# Patient Record
Sex: FEMALE | Race: ASIAN | NOT HISPANIC OR LATINO
[De-identification: names, ages, dates, MRNs, and addresses within clinical notes are randomized per-mention and may not be internally consistent; named-entity substitution may affect disease eponyms.]

---

## 2017-04-07 ENCOUNTER — APPOINTMENT (OUTPATIENT)
Dept: ORTHOPEDIC SURGERY | Facility: CLINIC | Age: 47
End: 2017-04-07

## 2017-04-07 ENCOUNTER — APPOINTMENT (OUTPATIENT)
Dept: MRI IMAGING | Facility: CLINIC | Age: 47
End: 2017-04-07

## 2017-04-07 ENCOUNTER — OUTPATIENT (OUTPATIENT)
Dept: OUTPATIENT SERVICES | Facility: HOSPITAL | Age: 47
LOS: 1 days | End: 2017-04-07
Payer: COMMERCIAL

## 2017-04-07 VITALS
SYSTOLIC BLOOD PRESSURE: 129 MMHG | HEIGHT: 64 IN | DIASTOLIC BLOOD PRESSURE: 74 MMHG | HEART RATE: 79 BPM | BODY MASS INDEX: 26.98 KG/M2 | WEIGHT: 158 LBS

## 2017-04-07 DIAGNOSIS — M25.562 PAIN IN LEFT KNEE: ICD-10-CM

## 2017-04-07 PROCEDURE — 73721 MRI JNT OF LWR EXTRE W/O DYE: CPT

## 2017-04-18 ENCOUNTER — APPOINTMENT (OUTPATIENT)
Dept: ORTHOPEDIC SURGERY | Facility: AMBULATORY SURGERY CENTER | Age: 47
End: 2017-04-18

## 2017-04-27 ENCOUNTER — APPOINTMENT (OUTPATIENT)
Dept: ORTHOPEDIC SURGERY | Facility: CLINIC | Age: 47
End: 2017-04-27

## 2017-07-27 ENCOUNTER — APPOINTMENT (OUTPATIENT)
Dept: INTERNAL MEDICINE | Facility: CLINIC | Age: 47
End: 2017-07-27

## 2017-08-02 ENCOUNTER — APPOINTMENT (OUTPATIENT)
Dept: INTERNAL MEDICINE | Facility: CLINIC | Age: 47
End: 2017-08-02
Payer: COMMERCIAL

## 2017-08-02 VITALS
OXYGEN SATURATION: 98 % | SYSTOLIC BLOOD PRESSURE: 120 MMHG | DIASTOLIC BLOOD PRESSURE: 80 MMHG | BODY MASS INDEX: 26.98 KG/M2 | HEIGHT: 64 IN | HEART RATE: 76 BPM | WEIGHT: 158 LBS

## 2017-08-02 DIAGNOSIS — M25.562 PAIN IN LEFT KNEE: ICD-10-CM

## 2017-08-02 DIAGNOSIS — Z87.42 PERSONAL HISTORY OF OTHER DISEASES OF THE FEMALE GENITAL TRACT: ICD-10-CM

## 2017-08-02 DIAGNOSIS — M23.92 UNSPECIFIED INTERNAL DERANGEMENT OF LEFT KNEE: ICD-10-CM

## 2017-08-02 DIAGNOSIS — Z87.898 PERSONAL HISTORY OF OTHER SPECIFIED CONDITIONS: ICD-10-CM

## 2017-08-02 PROCEDURE — 99386 PREV VISIT NEW AGE 40-64: CPT | Mod: 25

## 2017-08-02 PROCEDURE — 90471 IMMUNIZATION ADMIN: CPT

## 2017-08-02 PROCEDURE — 90715 TDAP VACCINE 7 YRS/> IM: CPT

## 2017-08-02 RX ORDER — METHYLPREDNISOLONE 4 MG/1
4 TABLET ORAL
Qty: 21 | Refills: 0 | Status: DISCONTINUED | COMMUNITY
Start: 2017-02-23 | End: 2017-08-02

## 2017-08-02 RX ORDER — AZITHROMYCIN 250 MG/1
250 TABLET, FILM COATED ORAL
Qty: 6 | Refills: 0 | Status: DISCONTINUED | COMMUNITY
Start: 2017-02-23 | End: 2017-08-02

## 2017-08-04 LAB
ADJUSTED MITOGEN: >10 IU/ML
ADJUSTED TB AG: 0.02 IU/ML
ALBUMIN SERPL ELPH-MCNC: 4.7 G/DL
ALP BLD-CCNC: 47 U/L
ALT SERPL-CCNC: 12 U/L
ANION GAP SERPL CALC-SCNC: 17 MMOL/L
AST SERPL-CCNC: 19 U/L
BASOPHILS # BLD AUTO: 0.05 K/UL
BASOPHILS NFR BLD AUTO: 0.6 %
BILIRUB SERPL-MCNC: 0.3 MG/DL
BUN SERPL-MCNC: 17 MG/DL
CALCIUM SERPL-MCNC: 10 MG/DL
CHLORIDE SERPL-SCNC: 102 MMOL/L
CHOLEST SERPL-MCNC: 251 MG/DL
CHOLEST/HDLC SERPL: 3.9 RATIO
CO2 SERPL-SCNC: 21 MMOL/L
CREAT SERPL-MCNC: 0.78 MG/DL
EOSINOPHIL # BLD AUTO: 0.11 K/UL
EOSINOPHIL NFR BLD AUTO: 1.4 %
GLUCOSE SERPL-MCNC: 103 MG/DL
HBA1C MFR BLD HPLC: 5.9 %
HCT VFR BLD CALC: 41.4 %
HDLC SERPL-MCNC: 64 MG/DL
HGB BLD-MCNC: 13.5 G/DL
IMM GRANULOCYTES NFR BLD AUTO: 0.3 %
LDLC SERPL CALC-MCNC: 144 MG/DL
LYMPHOCYTES # BLD AUTO: 3.93 K/UL
LYMPHOCYTES NFR BLD AUTO: 49.7 %
M TB IFN-G BLD-IMP: NEGATIVE
MAN DIFF?: NORMAL
MCHC RBC-ENTMCNC: 29.5 PG
MCHC RBC-ENTMCNC: 32.6 GM/DL
MCV RBC AUTO: 90.4 FL
MONOCYTES # BLD AUTO: 0.49 K/UL
MONOCYTES NFR BLD AUTO: 6.2 %
NEUTROPHILS # BLD AUTO: 3.3 K/UL
NEUTROPHILS NFR BLD AUTO: 41.8 %
PLATELET # BLD AUTO: 333 K/UL
POTASSIUM SERPL-SCNC: 4.1 MMOL/L
PROT SERPL-MCNC: 8.1 G/DL
QUANTIFERON GOLD NIL: 0.09 IU/ML
RBC # BLD: 4.58 M/UL
RBC # FLD: 13.7 %
SODIUM SERPL-SCNC: 140 MMOL/L
TRIGL SERPL-MCNC: 214 MG/DL
TSH SERPL-ACNC: 1.48 UIU/ML
WBC # FLD AUTO: 7.9 K/UL

## 2017-08-16 ENCOUNTER — APPOINTMENT (OUTPATIENT)
Dept: INTERNAL MEDICINE | Facility: CLINIC | Age: 47
End: 2017-08-16

## 2017-09-13 ENCOUNTER — APPOINTMENT (OUTPATIENT)
Dept: INTERNAL MEDICINE | Facility: CLINIC | Age: 47
End: 2017-09-13

## 2017-10-26 ENCOUNTER — FORM ENCOUNTER (OUTPATIENT)
Age: 47
End: 2017-10-26

## 2017-10-27 ENCOUNTER — OUTPATIENT (OUTPATIENT)
Dept: OUTPATIENT SERVICES | Facility: HOSPITAL | Age: 47
LOS: 1 days | End: 2017-10-27
Payer: COMMERCIAL

## 2017-10-27 ENCOUNTER — APPOINTMENT (OUTPATIENT)
Dept: MAMMOGRAPHY | Facility: IMAGING CENTER | Age: 47
End: 2017-10-27

## 2017-10-27 DIAGNOSIS — Z00.00 ENCOUNTER FOR GENERAL ADULT MEDICAL EXAMINATION WITHOUT ABNORMAL FINDINGS: ICD-10-CM

## 2017-10-27 PROCEDURE — 77067 SCR MAMMO BI INCL CAD: CPT

## 2017-10-27 PROCEDURE — G0202: CPT | Mod: 26

## 2017-10-27 PROCEDURE — 77063 BREAST TOMOSYNTHESIS BI: CPT | Mod: 26

## 2017-10-27 PROCEDURE — 77063 BREAST TOMOSYNTHESIS BI: CPT

## 2017-12-22 ENCOUNTER — APPOINTMENT (OUTPATIENT)
Dept: PULMONOLOGY | Facility: CLINIC | Age: 47
End: 2017-12-22
Payer: COMMERCIAL

## 2017-12-22 VITALS
SYSTOLIC BLOOD PRESSURE: 130 MMHG | BODY MASS INDEX: 25.61 KG/M2 | RESPIRATION RATE: 16 BRPM | DIASTOLIC BLOOD PRESSURE: 90 MMHG | WEIGHT: 150 LBS | HEIGHT: 64 IN | HEART RATE: 97 BPM | TEMPERATURE: 99.3 F

## 2017-12-22 PROCEDURE — 94060 EVALUATION OF WHEEZING: CPT

## 2017-12-22 PROCEDURE — 94726 PLETHYSMOGRAPHY LUNG VOLUMES: CPT

## 2017-12-22 PROCEDURE — 94729 DIFFUSING CAPACITY: CPT

## 2017-12-22 PROCEDURE — 99203 OFFICE O/P NEW LOW 30 MIN: CPT | Mod: 25

## 2017-12-22 PROCEDURE — ZZZZZ: CPT

## 2018-03-08 ENCOUNTER — MEDICATION RENEWAL (OUTPATIENT)
Age: 48
End: 2018-03-08

## 2018-07-10 ENCOUNTER — APPOINTMENT (OUTPATIENT)
Dept: PULMONOLOGY | Facility: CLINIC | Age: 48
End: 2018-07-10
Payer: COMMERCIAL

## 2018-07-10 VITALS
HEART RATE: 88 BPM | WEIGHT: 150 LBS | RESPIRATION RATE: 16 BRPM | BODY MASS INDEX: 25.61 KG/M2 | TEMPERATURE: 98.6 F | OXYGEN SATURATION: 98 % | DIASTOLIC BLOOD PRESSURE: 70 MMHG | HEIGHT: 64 IN | SYSTOLIC BLOOD PRESSURE: 110 MMHG

## 2018-07-10 PROCEDURE — 99214 OFFICE O/P EST MOD 30 MIN: CPT

## 2018-07-13 ENCOUNTER — MEDICATION RENEWAL (OUTPATIENT)
Age: 48
End: 2018-07-13

## 2018-07-17 ENCOUNTER — FORM ENCOUNTER (OUTPATIENT)
Age: 48
End: 2018-07-17

## 2018-07-18 ENCOUNTER — APPOINTMENT (OUTPATIENT)
Dept: RADIOLOGY | Facility: IMAGING CENTER | Age: 48
End: 2018-07-18
Payer: COMMERCIAL

## 2018-07-18 ENCOUNTER — OUTPATIENT (OUTPATIENT)
Dept: OUTPATIENT SERVICES | Facility: HOSPITAL | Age: 48
LOS: 1 days | End: 2018-07-18
Payer: COMMERCIAL

## 2018-07-18 ENCOUNTER — APPOINTMENT (OUTPATIENT)
Dept: CT IMAGING | Facility: IMAGING CENTER | Age: 48
End: 2018-07-18
Payer: COMMERCIAL

## 2018-07-18 DIAGNOSIS — R05 COUGH: ICD-10-CM

## 2018-07-18 PROCEDURE — 70486 CT MAXILLOFACIAL W/O DYE: CPT | Mod: 26

## 2018-07-18 PROCEDURE — 70486 CT MAXILLOFACIAL W/O DYE: CPT

## 2018-07-18 PROCEDURE — 71046 X-RAY EXAM CHEST 2 VIEWS: CPT

## 2018-07-18 PROCEDURE — 71046 X-RAY EXAM CHEST 2 VIEWS: CPT | Mod: 26

## 2018-07-27 ENCOUNTER — RESULT REVIEW (OUTPATIENT)
Age: 48
End: 2018-07-27

## 2018-08-20 ENCOUNTER — APPOINTMENT (OUTPATIENT)
Dept: MRI IMAGING | Facility: CLINIC | Age: 48
End: 2018-08-20
Payer: COMMERCIAL

## 2018-08-20 ENCOUNTER — OUTPATIENT (OUTPATIENT)
Dept: OUTPATIENT SERVICES | Facility: HOSPITAL | Age: 48
LOS: 1 days | End: 2018-08-20
Payer: COMMERCIAL

## 2018-08-20 DIAGNOSIS — Z00.8 ENCOUNTER FOR OTHER GENERAL EXAMINATION: ICD-10-CM

## 2018-08-20 PROCEDURE — 73721 MRI JNT OF LWR EXTRE W/O DYE: CPT

## 2018-08-20 PROCEDURE — 73721 MRI JNT OF LWR EXTRE W/O DYE: CPT | Mod: 26,LT

## 2019-03-29 ENCOUNTER — MEDICATION RENEWAL (OUTPATIENT)
Age: 49
End: 2019-03-29

## 2019-04-03 ENCOUNTER — MEDICATION RENEWAL (OUTPATIENT)
Age: 49
End: 2019-04-03

## 2019-04-03 RX ORDER — AZELASTINE HYDROCHLORIDE 205.5 UG/1
0.15 SPRAY, METERED NASAL TWICE DAILY
Qty: 1 | Refills: 3 | Status: DISCONTINUED | COMMUNITY
Start: 2018-07-10 | End: 2019-04-03

## 2020-02-05 ENCOUNTER — RX RENEWAL (OUTPATIENT)
Age: 50
End: 2020-02-05

## 2020-03-23 ENCOUNTER — EMERGENCY (EMERGENCY)
Facility: HOSPITAL | Age: 50
LOS: 1 days | Discharge: HOME | End: 2020-03-23
Attending: EMERGENCY MEDICINE | Admitting: EMERGENCY MEDICINE
Payer: OTHER MISCELLANEOUS

## 2020-03-23 VITALS
TEMPERATURE: 100 F | OXYGEN SATURATION: 99 % | HEART RATE: 88 BPM | DIASTOLIC BLOOD PRESSURE: 82 MMHG | SYSTOLIC BLOOD PRESSURE: 151 MMHG | RESPIRATION RATE: 17 BRPM

## 2020-03-23 PROCEDURE — 99283 EMERGENCY DEPT VISIT LOW MDM: CPT

## 2020-03-23 RX ORDER — ACETAMINOPHEN 500 MG
650 TABLET ORAL ONCE
Refills: 0 | Status: COMPLETED | OUTPATIENT
Start: 2020-03-23 | End: 2020-03-23

## 2020-03-23 RX ADMIN — Medication 650 MILLIGRAM(S): at 16:22

## 2020-03-23 NOTE — ED PROVIDER NOTE - CLINICAL SUMMARY MEDICAL DECISION MAKING FREE TEXT BOX
50 y/o female with no significant PMHx who presented to the ED for fever and cough X 1 day. +COVID-19 contact. Concern for COVID-19/URI

## 2020-03-23 NOTE — ED ADULT TRIAGE NOTE - CHIEF COMPLAINT QUOTE
Fever with flu like symptoms x 2 days. Pt traveled to [facility] about 3 weeks ago. Last took Tylenol @ about 10am

## 2020-03-23 NOTE — ED PROVIDER NOTE - PATIENT PORTAL LINK FT
You can access the FollowMyHealth Patient Portal offered by [facility] by registering at the following website: [email or web address]. By joining [employer]’s FollowMyHealth portal, you will also be able to view your health information using other applications (apps) compatible with our system.

## 2020-03-23 NOTE — ED PROVIDER NOTE - TOBACCO USE
Coronary artery disease involving native coronary artery of native heart without angina pectoris Never smoker

## 2020-03-23 NOTE — ED PROVIDER NOTE - OBJECTIVE STATEMENT
48 y/o female with no significant PMHx who presented to the ED for cough and fever X 1 day. Pt states over the past day having a non-productive cough with fever, Tmax of 100.4. Pt denies any nausea, vomiting, SOB, chest pain, or abd pain. 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

## 2020-03-24 LAB — SARS-COV-2 RNA SPEC QL NAA+PROBE: DETECTED

## 2020-03-24 RX ORDER — FAMOTIDINE 10 MG/ML
1 INJECTION INTRAVENOUS
Qty: 60 | Refills: 0
Start: 2020-03-24 | End: 2020-04-22

## 2020-03-24 RX ORDER — AZITHROMYCIN 500 MG/1
1 TABLET, FILM COATED ORAL
Qty: 1 | Refills: 0
Start: 2020-03-24

## 2020-04-25 ENCOUNTER — MESSAGE (OUTPATIENT)
Age: 50
End: 2020-04-25

## 2020-05-03 LAB
SARS-COV-2 IGG SERPL IA-ACNC: 3.5 INDEX
SARS-COV-2 IGG SERPL QL IA: POSITIVE

## 2020-08-27 ENCOUNTER — APPOINTMENT (OUTPATIENT)
Dept: MRI IMAGING | Facility: CLINIC | Age: 50
End: 2020-08-27

## 2020-08-27 ENCOUNTER — OUTPATIENT (OUTPATIENT)
Dept: OUTPATIENT SERVICES | Facility: HOSPITAL | Age: 50
LOS: 1 days | End: 2020-08-27
Payer: COMMERCIAL

## 2020-08-27 DIAGNOSIS — Z00.8 ENCOUNTER FOR OTHER GENERAL EXAMINATION: ICD-10-CM

## 2020-08-27 PROCEDURE — 73721 MRI JNT OF LWR EXTRE W/O DYE: CPT | Mod: 26,LT

## 2020-08-27 PROCEDURE — 73721 MRI JNT OF LWR EXTRE W/O DYE: CPT

## 2020-09-10 ENCOUNTER — TRANSCRIPTION ENCOUNTER (OUTPATIENT)
Age: 50
End: 2020-09-10

## 2020-09-18 ENCOUNTER — OUTPATIENT (OUTPATIENT)
Dept: OUTPATIENT SERVICES | Facility: HOSPITAL | Age: 50
LOS: 1 days | End: 2020-09-18
Payer: COMMERCIAL

## 2020-09-18 VITALS
OXYGEN SATURATION: 96 % | DIASTOLIC BLOOD PRESSURE: 87 MMHG | TEMPERATURE: 98 F | RESPIRATION RATE: 18 BRPM | HEIGHT: 64.5 IN | SYSTOLIC BLOOD PRESSURE: 144 MMHG | WEIGHT: 162.04 LBS | HEART RATE: 69 BPM

## 2020-09-18 DIAGNOSIS — Z98.890 OTHER SPECIFIED POSTPROCEDURAL STATES: Chronic | ICD-10-CM

## 2020-09-18 DIAGNOSIS — Z01.818 ENCOUNTER FOR OTHER PREPROCEDURAL EXAMINATION: ICD-10-CM

## 2020-09-18 DIAGNOSIS — S83.272A COMPLEX TEAR OF LATERAL MENISCUS, CURRENT INJURY, LEFT KNEE, INITIAL ENCOUNTER: ICD-10-CM

## 2020-09-18 LAB
HCT VFR BLD CALC: 43 % — SIGNIFICANT CHANGE UP (ref 34.5–45)
HGB BLD-MCNC: 13.9 G/DL — SIGNIFICANT CHANGE UP (ref 11.5–15.5)
MCHC RBC-ENTMCNC: 29.7 PG — SIGNIFICANT CHANGE UP (ref 27–34)
MCHC RBC-ENTMCNC: 32.3 GM/DL — SIGNIFICANT CHANGE UP (ref 32–36)
MCV RBC AUTO: 91.9 FL — SIGNIFICANT CHANGE UP (ref 80–100)
NRBC # BLD: 0 /100 WBCS — SIGNIFICANT CHANGE UP (ref 0–0)
PLATELET # BLD AUTO: 265 K/UL — SIGNIFICANT CHANGE UP (ref 150–400)
RBC # BLD: 4.68 M/UL — SIGNIFICANT CHANGE UP (ref 3.8–5.2)
RBC # FLD: 12.8 % — SIGNIFICANT CHANGE UP (ref 10.3–14.5)
WBC # BLD: 6.11 K/UL — SIGNIFICANT CHANGE UP (ref 3.8–10.5)
WBC # FLD AUTO: 6.11 K/UL — SIGNIFICANT CHANGE UP (ref 3.8–10.5)

## 2020-09-18 PROCEDURE — 85027 COMPLETE CBC AUTOMATED: CPT

## 2020-09-18 PROCEDURE — G0463: CPT

## 2020-09-18 RX ORDER — SODIUM CHLORIDE 9 MG/ML
3 INJECTION INTRAMUSCULAR; INTRAVENOUS; SUBCUTANEOUS EVERY 8 HOURS
Refills: 0 | Status: DISCONTINUED | OUTPATIENT
Start: 2020-09-24 | End: 2020-10-08

## 2020-09-18 NOTE — H&P PST ADULT - NSICDXPASTSURGICALHX_GEN_ALL_CORE_FT
PAST SURGICAL HISTORY:  H/O shoulder surgery      PAST SURGICAL HISTORY:  H/O shoulder surgery left ~2009

## 2020-09-18 NOTE — H&P PST ADULT - NSICDXFAMILYHX_GEN_ALL_CORE_FT
FAMILY HISTORY:  Sibling  Still living? Unknown  Family history of early CAD, Age at diagnosis: Age Unknown

## 2020-09-18 NOTE — H&P PST ADULT - NSICDXPROBLEM_GEN_ALL_CORE_FT
PROBLEM DIAGNOSES  Problem: Complex tear of lateral meniscus of left knee  Assessment and Plan: LEFT KNEE ARTHROSCOPY  Pre-op education provided - all questions answered   Chlorhex soap & instructions given  CBC sent in PST

## 2020-09-19 DIAGNOSIS — Z01.818 ENCOUNTER FOR OTHER PREPROCEDURAL EXAMINATION: ICD-10-CM

## 2020-09-21 ENCOUNTER — APPOINTMENT (OUTPATIENT)
Dept: DISASTER EMERGENCY | Facility: CLINIC | Age: 50
End: 2020-09-21

## 2020-09-21 LAB — SARS-COV-2 N GENE NPH QL NAA+PROBE: NOT DETECTED

## 2020-09-23 ENCOUNTER — TRANSCRIPTION ENCOUNTER (OUTPATIENT)
Age: 50
End: 2020-09-23

## 2020-09-24 ENCOUNTER — OUTPATIENT (OUTPATIENT)
Dept: OUTPATIENT SERVICES | Facility: HOSPITAL | Age: 50
LOS: 1 days | End: 2020-09-24
Payer: COMMERCIAL

## 2020-09-24 VITALS
SYSTOLIC BLOOD PRESSURE: 110 MMHG | RESPIRATION RATE: 16 BRPM | DIASTOLIC BLOOD PRESSURE: 74 MMHG | WEIGHT: 162.04 LBS | HEIGHT: 64.5 IN | OXYGEN SATURATION: 99 % | TEMPERATURE: 98 F | HEART RATE: 62 BPM

## 2020-09-24 VITALS
OXYGEN SATURATION: 100 % | HEART RATE: 60 BPM | SYSTOLIC BLOOD PRESSURE: 130 MMHG | TEMPERATURE: 97 F | RESPIRATION RATE: 18 BRPM | DIASTOLIC BLOOD PRESSURE: 72 MMHG

## 2020-09-24 DIAGNOSIS — S83.272A COMPLEX TEAR OF LATERAL MENISCUS, CURRENT INJURY, LEFT KNEE, INITIAL ENCOUNTER: ICD-10-CM

## 2020-09-24 DIAGNOSIS — Z98.890 OTHER SPECIFIED POSTPROCEDURAL STATES: Chronic | ICD-10-CM

## 2020-09-24 PROCEDURE — 29881 ARTHRS KNE SRG MNISECTMY M/L: CPT | Mod: LT

## 2020-09-24 RX ORDER — HYDROMORPHONE HYDROCHLORIDE 2 MG/ML
0.5 INJECTION INTRAMUSCULAR; INTRAVENOUS; SUBCUTANEOUS
Refills: 0 | Status: DISCONTINUED | OUTPATIENT
Start: 2020-09-24 | End: 2020-09-24

## 2020-09-24 RX ORDER — CHLORHEXIDINE GLUCONATE 213 G/1000ML
1 SOLUTION TOPICAL ONCE
Refills: 0 | Status: COMPLETED | OUTPATIENT
Start: 2020-09-24 | End: 2020-09-24

## 2020-09-24 RX ORDER — KETOROLAC TROMETHAMINE 30 MG/ML
30 SYRINGE (ML) INJECTION ONCE
Refills: 0 | Status: DISCONTINUED | OUTPATIENT
Start: 2020-09-24 | End: 2020-09-24

## 2020-09-24 RX ORDER — ONDANSETRON 8 MG/1
4 TABLET, FILM COATED ORAL ONCE
Refills: 0 | Status: DISCONTINUED | OUTPATIENT
Start: 2020-09-24 | End: 2020-10-08

## 2020-09-24 RX ORDER — HYDROMORPHONE HYDROCHLORIDE 2 MG/ML
0.25 INJECTION INTRAMUSCULAR; INTRAVENOUS; SUBCUTANEOUS
Refills: 0 | Status: DISCONTINUED | OUTPATIENT
Start: 2020-09-24 | End: 2020-09-24

## 2020-09-24 RX ADMIN — Medication 30 MILLIGRAM(S): at 09:29

## 2020-09-24 RX ADMIN — CHLORHEXIDINE GLUCONATE 1 APPLICATION(S): 213 SOLUTION TOPICAL at 06:05

## 2020-09-24 NOTE — ASU PATIENT PROFILE, ADULT - NSCAFFEAMTFREQ_GEN_ALL_CORE_SD
CONSTITUTIONAL: No weakness, fevers or chills. Scratches to left forehead  EYES/ENT: blurred vision;  No vertigo or throat pain   NECK: No pain or stiffness  RESPIRATORY: No cough, wheezing, hemoptysis; No shortness of breath  CARDIOVASCULAR: No chest pain or palpitations  GASTROINTESTINAL: No abdominal or epigastric pain. No nausea, vomiting, or hematemesis; No diarrhea or constipation. No melena or hematochezia.  GENITOURINARY: No dysuria, frequency or hematuria  NEUROLOGICAL: No numbness or weakness  All other review of systems is negative unless indicated above. 1-2 cups/cans per day CONSTITUTIONAL: No weakness, fevers or chills. Scratches to right forehead  EYES/ENT: blurred vision;  No vertigo or throat pain   NECK: No pain or stiffness  RESPIRATORY: No cough, wheezing, hemoptysis; No shortness of breath  CARDIOVASCULAR: No chest pain or palpitations  GASTROINTESTINAL: No abdominal or epigastric pain. No nausea, vomiting, or hematemesis; No diarrhea or constipation. No melena or hematochezia.  GENITOURINARY: No dysuria, frequency or hematuria  NEUROLOGICAL: No numbness or weakness  All other review of systems is negative unless indicated above.

## 2020-09-24 NOTE — ASU DISCHARGE PLAN (ADULT/PEDIATRIC) - NURSING INSTRUCTIONS
Tylenol/acetaminophen--------AND/OR---------Motrin/ibuprofen as needed for pain/discomfort.  Narcotic medications as prescribed by MD.  NEXT DOSES:  Tylenol OK @  1:30pm this afternoon.   Motrin OK @ 2:40pm this afternoon, if needed.  Read and follow preprinted, MD-specific instructions provided.  Follow up with MD as requested; call office for appointment.

## 2020-09-24 NOTE — ASU PATIENT PROFILE, ADULT - MENTAL HEALTH CONDITIONS/SYMPTOMS, PROFILE
Detail Level: Zone
Otc Regimen: Patient may use Rogaine daily
Initiate Treatment: Propecia 1mg take one daily
Plan: Patient was advised to not pick at the lesion
Continue Regimen: Salicylic acid
none

## 2020-12-15 ENCOUNTER — RESULT REVIEW (OUTPATIENT)
Age: 50
End: 2020-12-15

## 2021-03-17 PROBLEM — K21.9 GASTRO-ESOPHAGEAL REFLUX DISEASE WITHOUT ESOPHAGITIS: Chronic | Status: ACTIVE | Noted: 2020-09-24

## 2021-03-17 PROBLEM — J45.909 UNSPECIFIED ASTHMA, UNCOMPLICATED: Chronic | Status: ACTIVE | Noted: 2020-09-24

## 2021-03-18 ENCOUNTER — OUTPATIENT (OUTPATIENT)
Dept: OUTPATIENT SERVICES | Facility: HOSPITAL | Age: 51
LOS: 1 days | End: 2021-03-18
Payer: COMMERCIAL

## 2021-03-18 ENCOUNTER — RESULT REVIEW (OUTPATIENT)
Age: 51
End: 2021-03-18

## 2021-03-18 ENCOUNTER — APPOINTMENT (OUTPATIENT)
Dept: MAMMOGRAPHY | Facility: IMAGING CENTER | Age: 51
End: 2021-03-18
Payer: COMMERCIAL

## 2021-03-18 DIAGNOSIS — Z00.8 ENCOUNTER FOR OTHER GENERAL EXAMINATION: ICD-10-CM

## 2021-03-18 DIAGNOSIS — Z98.890 OTHER SPECIFIED POSTPROCEDURAL STATES: Chronic | ICD-10-CM

## 2021-03-18 PROCEDURE — 77067 SCR MAMMO BI INCL CAD: CPT | Mod: 26

## 2021-03-18 PROCEDURE — 77067 SCR MAMMO BI INCL CAD: CPT

## 2021-03-18 PROCEDURE — 77063 BREAST TOMOSYNTHESIS BI: CPT

## 2021-03-18 PROCEDURE — 77063 BREAST TOMOSYNTHESIS BI: CPT | Mod: 26

## 2021-03-22 ENCOUNTER — NON-APPOINTMENT (OUTPATIENT)
Age: 51
End: 2021-03-22

## 2021-09-22 ENCOUNTER — APPOINTMENT (OUTPATIENT)
Dept: PULMONOLOGY | Facility: CLINIC | Age: 51
End: 2021-09-22
Payer: COMMERCIAL

## 2021-09-22 VITALS
HEART RATE: 64 BPM | HEIGHT: 64 IN | WEIGHT: 150 LBS | BODY MASS INDEX: 25.61 KG/M2 | RESPIRATION RATE: 15 BRPM | DIASTOLIC BLOOD PRESSURE: 84 MMHG | TEMPERATURE: 97 F | SYSTOLIC BLOOD PRESSURE: 137 MMHG | OXYGEN SATURATION: 100 %

## 2021-09-22 PROCEDURE — 99213 OFFICE O/P EST LOW 20 MIN: CPT

## 2021-09-22 NOTE — PHYSICAL EXAM
[No Acute Distress] : no acute distress [Normal Rate/Rhythm] : normal rate/rhythm [Normal S1, S2] : normal s1, s2 [No Murmurs] : no murmurs [No Resp Distress] : no resp distress [No Focal Deficits] : no focal deficits [Oriented x3] : oriented x3 [Normal Affect] : normal affect [TextBox_68] : inspiratory wheeze cleared with coughing

## 2021-09-22 NOTE — REVIEW OF SYSTEMS
[Cough] : cough [Sputum] : sputum [Wheezing] : wheezing [Negative] : Cardiovascular [TextBox_30] : clear sputum

## 2021-09-22 NOTE — ASSESSMENT
[FreeTextEntry1] : 50yo female patient here for f/u for chronic cough without relief from fluticasone, Breo, or albuterol.  She does have mild asthma, so, we recommended that she try Advair.  If her symptoms are no better in 4 weeks, she will call the office.

## 2021-09-22 NOTE — END OF VISIT
[FreeTextEntry3] : I obtained the history and examined the patient and developed a plan of care  Simon Ernst MD\par

## 2021-09-22 NOTE — HISTORY OF PRESENT ILLNESS
[FreeTextEntry1] : 52yo female patient here for f/u for chronic cough.  Previously saw Dr. Ernst in 2018 for upper airway cough, and was prescribed fluticasone and ProAir, and Breo. She reports that the medications have not been helpful and does not take them regularly. \par The cough is productive (clear), pattern unchanged although her  reports it is slightly worse than before.  She does not recall having any hx of asthma or allergies.  She also admits to some wheezing.  She otherwise denies fevers, chills, recent URI.\par She had CT scan of the sinuses which showed some mild thickening and 1 nasal polyp or cyst.     \par \par Family history of atopic disease; brother asthmatic

## 2022-01-04 NOTE — ASU PATIENT PROFILE, ADULT - AS SC BRADEN SENSORY
From Dr Suazo :  I am unaware of any labs he would need prior to being seen in clinic to schedule surgery.  He does need a letter from his cardiologist stating he is cleared for surgery however.     I spoke with the patient and he will bring a letter to his appt.    Michelle Caballero   (4) no impairment

## 2022-08-31 RX ORDER — FLUTICASONE PROPIONATE 50 UG/1
50 SPRAY, METERED NASAL TWICE DAILY
Qty: 1 | Refills: 3 | Status: DISCONTINUED | COMMUNITY
Start: 2017-12-22 | End: 2022-08-31

## 2022-08-31 RX ORDER — ALBUTEROL SULFATE 90 UG/1
108 (90 BASE) AEROSOL, METERED RESPIRATORY (INHALATION)
Qty: 8 | Refills: 0 | Status: DISCONTINUED | COMMUNITY
Start: 2017-02-23 | End: 2022-08-31

## 2022-08-31 RX ORDER — ESTRADIOL 0.1 MG/G
0.1 CREAM VAGINAL
Qty: 1 | Refills: 1 | Status: DISCONTINUED | COMMUNITY
Start: 2021-06-21 | End: 2022-08-31

## 2022-08-31 RX ORDER — FLUTICASONE PROPIONATE AND SALMETEROL XINAFOATE 230; 21 UG/1; UG/1
230-21 AEROSOL, METERED RESPIRATORY (INHALATION)
Qty: 1 | Refills: 3 | Status: DISCONTINUED | COMMUNITY
Start: 2021-09-22 | End: 2022-08-31

## 2022-08-31 RX ORDER — FLUTICASONE FUROATE AND VILANTEROL TRIFENATATE 200; 25 UG/1; UG/1
200-25 POWDER RESPIRATORY (INHALATION)
Qty: 1 | Refills: 1 | Status: DISCONTINUED | COMMUNITY
Start: 2018-07-10 | End: 2022-08-31

## 2022-08-31 RX ORDER — ESTRADIOL 10 UG/1
10 INSERT VAGINAL
Qty: 40 | Refills: 3 | Status: DISCONTINUED | COMMUNITY
Start: 2021-06-21 | End: 2022-08-31

## 2022-08-31 RX ORDER — AZELASTINE HYDROCHLORIDE 137 UG/1
137 SPRAY, METERED NASAL TWICE DAILY
Qty: 2 | Refills: 3 | Status: DISCONTINUED | COMMUNITY
Start: 2019-04-03 | End: 2022-08-31

## 2022-08-31 RX ORDER — ALBUTEROL SULFATE 90 UG/1
108 (90 BASE) AEROSOL, METERED RESPIRATORY (INHALATION)
Qty: 2 | Refills: 3 | Status: DISCONTINUED | COMMUNITY
Start: 2017-12-22 | End: 2022-08-31

## 2022-09-08 ENCOUNTER — NON-APPOINTMENT (OUTPATIENT)
Age: 52
End: 2022-09-08

## 2022-09-08 ENCOUNTER — APPOINTMENT (OUTPATIENT)
Dept: CARDIOLOGY | Facility: CLINIC | Age: 52
End: 2022-09-08

## 2022-09-08 VITALS
HEART RATE: 67 BPM | DIASTOLIC BLOOD PRESSURE: 90 MMHG | BODY MASS INDEX: 27.64 KG/M2 | HEIGHT: 64 IN | SYSTOLIC BLOOD PRESSURE: 148 MMHG | OXYGEN SATURATION: 98 % | TEMPERATURE: 98.3 F

## 2022-09-08 VITALS — WEIGHT: 161 LBS | BODY MASS INDEX: 27.64 KG/M2

## 2022-09-08 VITALS — SYSTOLIC BLOOD PRESSURE: 138 MMHG | DIASTOLIC BLOOD PRESSURE: 82 MMHG

## 2022-09-08 PROCEDURE — 36415 COLL VENOUS BLD VENIPUNCTURE: CPT

## 2022-09-08 PROCEDURE — 99204 OFFICE O/P NEW MOD 45 MIN: CPT

## 2022-09-08 PROCEDURE — 93000 ELECTROCARDIOGRAM COMPLETE: CPT

## 2022-09-08 NOTE — ASSESSMENT
[FreeTextEntry1] : 1. Schedule 2D echo to screen for myxoma. I don’t believe cardiac MRI is needed, but if there is anything abnormal on the echo we will move to cMRI.\par 2.. Check labs, in office, today.\par 3. HLD. If LDL remains elevated, we will schedule cardiac CT to evaluate for occult CAD which would prompt more aggressive therapy. \par 4. Blood pressure was elevated, even on reevaluation. The patient will keep a log of blood pressures at home, per protocol. We will review the log during the next visit.\par 5. We have reviewed current AHA exercise guidelines, including 30 minutes 5 days per week of moderate level aerobic activity and 20 minutes twice per week of strength training.\par 6. Elevated Hgb A1C. We reviewed carbohydrate restriction in detail.\par

## 2022-09-08 NOTE — REASON FOR VISIT
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 The patient's father had an atrial myxoma that was excised, and she was screened 10 years ago by MRI. \par \par On review of her records, the patient has had no blood work for 5 years. Her last A1C was 5.9% and her  and . HDL was excellent at 64.\par \par She exercises by swimming laps for 1 hour 3x/week, and her nutrition is carbohydrate restriction coupled with 16:8 IF. \par \par She denies CP or dyspnea. \par \par She has regular mammograms, but has not had a colonoscopy or Shingles vaccine. I asked her to establish care with a primary care physician. \par \par 1. Schedule 2D echo to screen for myxoma. I don’t believe cardiac MRI is needed, but if there is anything abnormal on the echo we will move to cMRI.\par 2.. Check labs, in office, today.\par 3. HLD. If LDL remains elevated, we will schedule cardiac CT to evaluate for occult CAD which would prompt more aggressive therapy. \par 4. Blood pressure was elevated, even on reevaluation. The patient will keep a log of blood pressures at home, per protocol. We will review the log during the next visit.\par 5. We have reviewed current AHA exercise guidelines, including 30 minutes 5 days per week of moderate level aerobic activity and 20 minutes twice per week of strength training.\par 6. Elevated Hgb A1C. We reviewed carbohydrate restriction in detail.\par

## 2022-09-09 LAB
ALBUMIN SERPL ELPH-MCNC: 5.1 G/DL
ALP BLD-CCNC: 59 U/L
ALT SERPL-CCNC: 26 U/L
ANION GAP SERPL CALC-SCNC: 16 MMOL/L
AST SERPL-CCNC: 24 U/L
BASOPHILS # BLD AUTO: 0.06 K/UL
BASOPHILS NFR BLD AUTO: 0.9 %
BILIRUB SERPL-MCNC: 0.5 MG/DL
BUN SERPL-MCNC: 15 MG/DL
CALCIUM SERPL-MCNC: 10.2 MG/DL
CHLORIDE SERPL-SCNC: 97 MMOL/L
CHOLEST SERPL-MCNC: 301 MG/DL
CO2 SERPL-SCNC: 26 MMOL/L
CREAT SERPL-MCNC: 0.53 MG/DL
CRP SERPL HS-MCNC: 7.33 MG/L
EGFR: 110 ML/MIN/1.73M2
EOSINOPHIL # BLD AUTO: 0.07 K/UL
EOSINOPHIL NFR BLD AUTO: 1.1 %
ESTIMATED AVERAGE GLUCOSE: 128 MG/DL
GLUCOSE SERPL-MCNC: 89 MG/DL
HBA1C MFR BLD HPLC: 6.1 %
HCT VFR BLD CALC: 44.5 %
HDLC SERPL-MCNC: 54 MG/DL
HGB BLD-MCNC: 14.7 G/DL
IMM GRANULOCYTES NFR BLD AUTO: 0.2 %
LDLC SERPL CALC-MCNC: 206 MG/DL
LYMPHOCYTES # BLD AUTO: 3.4 K/UL
LYMPHOCYTES NFR BLD AUTO: 52.6 %
MAN DIFF?: NORMAL
MCHC RBC-ENTMCNC: 29.8 PG
MCHC RBC-ENTMCNC: 33 GM/DL
MCV RBC AUTO: 90.1 FL
MONOCYTES # BLD AUTO: 0.36 K/UL
MONOCYTES NFR BLD AUTO: 5.6 %
NEUTROPHILS # BLD AUTO: 2.56 K/UL
NEUTROPHILS NFR BLD AUTO: 39.6 %
NONHDLC SERPL-MCNC: 248 MG/DL
PLATELET # BLD AUTO: 312 K/UL
POTASSIUM SERPL-SCNC: 4 MMOL/L
PROT SERPL-MCNC: 8.2 G/DL
RBC # BLD: 4.94 M/UL
RBC # FLD: 13.2 %
SODIUM SERPL-SCNC: 139 MMOL/L
TRIGL SERPL-MCNC: 210 MG/DL
TSH SERPL-ACNC: 1.01 UIU/ML
WBC # FLD AUTO: 6.46 K/UL

## 2022-09-16 ENCOUNTER — APPOINTMENT (OUTPATIENT)
Dept: CV DIAGNOSITCS | Facility: HOSPITAL | Age: 52
End: 2022-09-16

## 2022-09-16 ENCOUNTER — OUTPATIENT (OUTPATIENT)
Dept: OUTPATIENT SERVICES | Facility: HOSPITAL | Age: 52
LOS: 1 days | End: 2022-09-16

## 2022-09-16 DIAGNOSIS — Z98.890 OTHER SPECIFIED POSTPROCEDURAL STATES: Chronic | ICD-10-CM

## 2022-09-16 DIAGNOSIS — E78.00 PURE HYPERCHOLESTEROLEMIA, UNSPECIFIED: ICD-10-CM

## 2022-09-16 PROCEDURE — 93306 TTE W/DOPPLER COMPLETE: CPT | Mod: 26

## 2022-10-01 ENCOUNTER — EMERGENCY (EMERGENCY)
Facility: HOSPITAL | Age: 52
LOS: 1 days | Discharge: HOME | End: 2022-10-01
Attending: EMERGENCY MEDICINE

## 2022-10-01 VITALS — HEIGHT: 64.5 IN

## 2022-10-01 DIAGNOSIS — Z98.890 OTHER SPECIFIED POSTPROCEDURAL STATES: Chronic | ICD-10-CM

## 2022-10-01 LAB
ALBUMIN SERPL ELPH-MCNC: 4 G/DL — SIGNIFICANT CHANGE UP (ref 3.3–5)
ALP SERPL-CCNC: 313 U/L — HIGH (ref 40–120)
ALT FLD-CCNC: 439 U/L — HIGH (ref 4–33)
ANION GAP SERPL CALC-SCNC: 10 MMOL/L — SIGNIFICANT CHANGE UP (ref 7–14)
APAP SERPL-MCNC: <10 UG/ML — LOW (ref 15–25)
APPEARANCE UR: ABNORMAL
APTT BLD: 35.6 SEC — SIGNIFICANT CHANGE UP (ref 27–36.3)
AST SERPL-CCNC: 224 U/L — HIGH (ref 4–32)
B PERT DNA SPEC QL NAA+PROBE: SIGNIFICANT CHANGE UP
B PERT+PARAPERT DNA PNL SPEC NAA+PROBE: SIGNIFICANT CHANGE UP
BACTERIA # UR AUTO: NEGATIVE — SIGNIFICANT CHANGE UP
BASE EXCESS BLDV CALC-SCNC: 0.3 MMOL/L — SIGNIFICANT CHANGE UP (ref -2–3)
BASOPHILS # BLD AUTO: 0.01 K/UL — SIGNIFICANT CHANGE UP (ref 0–0.2)
BASOPHILS NFR BLD AUTO: 0.2 % — SIGNIFICANT CHANGE UP (ref 0–2)
BILIRUB SERPL-MCNC: 2 MG/DL — HIGH (ref 0.2–1.2)
BILIRUB UR-MCNC: ABNORMAL
BLD GP AB SCN SERPL QL: NEGATIVE — SIGNIFICANT CHANGE UP
BLOOD GAS VENOUS COMPREHENSIVE RESULT: SIGNIFICANT CHANGE UP
BORDETELLA PARAPERTUSSIS (RAPRVP): SIGNIFICANT CHANGE UP
BUN SERPL-MCNC: 7 MG/DL — SIGNIFICANT CHANGE UP (ref 7–23)
C PNEUM DNA SPEC QL NAA+PROBE: SIGNIFICANT CHANGE UP
CALCIUM SERPL-MCNC: 9.1 MG/DL — SIGNIFICANT CHANGE UP (ref 8.4–10.5)
CHLORIDE BLDV-SCNC: 102 MMOL/L — SIGNIFICANT CHANGE UP (ref 96–108)
CHLORIDE SERPL-SCNC: 102 MMOL/L — SIGNIFICANT CHANGE UP (ref 98–107)
CO2 BLDV-SCNC: 26.7 MMOL/L — HIGH (ref 22–26)
CO2 SERPL-SCNC: 25 MMOL/L — SIGNIFICANT CHANGE UP (ref 22–31)
COLOR SPEC: ABNORMAL
CREAT SERPL-MCNC: 0.59 MG/DL — SIGNIFICANT CHANGE UP (ref 0.5–1.3)
DIFF PNL FLD: ABNORMAL
EGFR: 107 ML/MIN/1.73M2 — SIGNIFICANT CHANGE UP
EOSINOPHIL # BLD AUTO: 0.16 K/UL — SIGNIFICANT CHANGE UP (ref 0–0.5)
EOSINOPHIL NFR BLD AUTO: 2.9 % — SIGNIFICANT CHANGE UP (ref 0–6)
EPI CELLS # UR: 3 /HPF — SIGNIFICANT CHANGE UP (ref 0–5)
FLUAV SUBTYP SPEC NAA+PROBE: SIGNIFICANT CHANGE UP
FLUBV RNA SPEC QL NAA+PROBE: SIGNIFICANT CHANGE UP
GAS PNL BLDV: 134 MMOL/L — LOW (ref 136–145)
GLUCOSE BLDV-MCNC: 133 MG/DL — HIGH (ref 70–99)
GLUCOSE SERPL-MCNC: 128 MG/DL — HIGH (ref 70–99)
GLUCOSE UR QL: NEGATIVE — SIGNIFICANT CHANGE UP
HADV DNA SPEC QL NAA+PROBE: SIGNIFICANT CHANGE UP
HCO3 BLDV-SCNC: 25 MMOL/L — SIGNIFICANT CHANGE UP (ref 22–29)
HCOV 229E RNA SPEC QL NAA+PROBE: SIGNIFICANT CHANGE UP
HCOV HKU1 RNA SPEC QL NAA+PROBE: SIGNIFICANT CHANGE UP
HCOV NL63 RNA SPEC QL NAA+PROBE: SIGNIFICANT CHANGE UP
HCOV OC43 RNA SPEC QL NAA+PROBE: SIGNIFICANT CHANGE UP
HCT VFR BLD CALC: 39 % — SIGNIFICANT CHANGE UP (ref 34.5–45)
HCT VFR BLDA CALC: 41 % — SIGNIFICANT CHANGE UP (ref 34.5–46.5)
HETEROPH AB TITR SER AGGL: NEGATIVE — SIGNIFICANT CHANGE UP
HGB BLD CALC-MCNC: 13.5 G/DL — SIGNIFICANT CHANGE UP (ref 11.5–15.5)
HGB BLD-MCNC: 12.7 G/DL — SIGNIFICANT CHANGE UP (ref 11.5–15.5)
HMPV RNA SPEC QL NAA+PROBE: SIGNIFICANT CHANGE UP
HPIV1 RNA SPEC QL NAA+PROBE: SIGNIFICANT CHANGE UP
HPIV2 RNA SPEC QL NAA+PROBE: SIGNIFICANT CHANGE UP
HPIV3 RNA SPEC QL NAA+PROBE: SIGNIFICANT CHANGE UP
HPIV4 RNA SPEC QL NAA+PROBE: SIGNIFICANT CHANGE UP
HYALINE CASTS # UR AUTO: 2 /LPF — SIGNIFICANT CHANGE UP (ref 0–7)
IANC: 4.3 K/UL — SIGNIFICANT CHANGE UP (ref 1.8–7.4)
IMM GRANULOCYTES NFR BLD AUTO: 0.4 % — SIGNIFICANT CHANGE UP (ref 0–0.9)
INR BLD: 1.37 RATIO — HIGH (ref 0.88–1.16)
KETONES UR-MCNC: ABNORMAL
LACTATE BLDV-MCNC: 1.1 MMOL/L — SIGNIFICANT CHANGE UP (ref 0.5–2)
LEUKOCYTE ESTERASE UR-ACNC: NEGATIVE — SIGNIFICANT CHANGE UP
LIDOCAIN IGE QN: 42 U/L — SIGNIFICANT CHANGE UP (ref 7–60)
LYMPHOCYTES # BLD AUTO: 0.61 K/UL — LOW (ref 1–3.3)
LYMPHOCYTES # BLD AUTO: 11.2 % — LOW (ref 13–44)
M PNEUMO DNA SPEC QL NAA+PROBE: SIGNIFICANT CHANGE UP
MCHC RBC-ENTMCNC: 29.4 PG — SIGNIFICANT CHANGE UP (ref 27–34)
MCHC RBC-ENTMCNC: 32.6 GM/DL — SIGNIFICANT CHANGE UP (ref 32–36)
MCV RBC AUTO: 90.3 FL — SIGNIFICANT CHANGE UP (ref 80–100)
MONOCYTES # BLD AUTO: 0.35 K/UL — SIGNIFICANT CHANGE UP (ref 0–0.9)
MONOCYTES NFR BLD AUTO: 6.4 % — SIGNIFICANT CHANGE UP (ref 2–14)
NEUTROPHILS # BLD AUTO: 4.3 K/UL — SIGNIFICANT CHANGE UP (ref 1.8–7.4)
NEUTROPHILS NFR BLD AUTO: 78.9 % — HIGH (ref 43–77)
NITRITE UR-MCNC: NEGATIVE — SIGNIFICANT CHANGE UP
NRBC # BLD: 0 /100 WBCS — SIGNIFICANT CHANGE UP (ref 0–0)
NRBC # FLD: 0 K/UL — SIGNIFICANT CHANGE UP (ref 0–0)
PCO2 BLDV: 42 MMHG — SIGNIFICANT CHANGE UP (ref 39–42)
PH BLDV: 7.39 — SIGNIFICANT CHANGE UP (ref 7.32–7.43)
PH UR: 6.5 — SIGNIFICANT CHANGE UP (ref 5–8)
PLATELET # BLD AUTO: 192 K/UL — SIGNIFICANT CHANGE UP (ref 150–400)
PO2 BLDV: 36 MMHG — SIGNIFICANT CHANGE UP
POTASSIUM BLDV-SCNC: 3.5 MMOL/L — SIGNIFICANT CHANGE UP (ref 3.5–5.1)
POTASSIUM SERPL-MCNC: 3.7 MMOL/L — SIGNIFICANT CHANGE UP (ref 3.5–5.3)
POTASSIUM SERPL-SCNC: 3.7 MMOL/L — SIGNIFICANT CHANGE UP (ref 3.5–5.3)
PROT SERPL-MCNC: 7 G/DL — SIGNIFICANT CHANGE UP (ref 6–8.3)
PROT UR-MCNC: ABNORMAL
PROTHROM AB SERPL-ACNC: 16 SEC — HIGH (ref 10.5–13.4)
RAPID RVP RESULT: SIGNIFICANT CHANGE UP
RBC # BLD: 4.32 M/UL — SIGNIFICANT CHANGE UP (ref 3.8–5.2)
RBC # FLD: 12.5 % — SIGNIFICANT CHANGE UP (ref 10.3–14.5)
RBC CASTS # UR COMP ASSIST: 3 /HPF — SIGNIFICANT CHANGE UP (ref 0–4)
RH IG SCN BLD-IMP: POSITIVE — SIGNIFICANT CHANGE UP
RSV RNA SPEC QL NAA+PROBE: SIGNIFICANT CHANGE UP
RV+EV RNA SPEC QL NAA+PROBE: SIGNIFICANT CHANGE UP
SAO2 % BLDV: 64.6 % — SIGNIFICANT CHANGE UP
SARS-COV-2 RNA SPEC QL NAA+PROBE: SIGNIFICANT CHANGE UP
SODIUM SERPL-SCNC: 137 MMOL/L — SIGNIFICANT CHANGE UP (ref 135–145)
SP GR SPEC: 1.03 — SIGNIFICANT CHANGE UP (ref 1.01–1.05)
UROBILINOGEN FLD QL: ABNORMAL
WBC # BLD: 5.45 K/UL — SIGNIFICANT CHANGE UP (ref 3.8–10.5)
WBC # FLD AUTO: 5.45 K/UL — SIGNIFICANT CHANGE UP (ref 3.8–10.5)
WBC UR QL: 7 /HPF — HIGH (ref 0–5)

## 2022-10-01 PROCEDURE — G1004: CPT

## 2022-10-01 PROCEDURE — 99220: CPT

## 2022-10-01 PROCEDURE — 74177 CT ABD & PELVIS W/CONTRAST: CPT | Mod: 26,ME

## 2022-10-01 PROCEDURE — 76705 ECHO EXAM OF ABDOMEN: CPT | Mod: 26

## 2022-10-01 PROCEDURE — 99283 EMERGENCY DEPT VISIT LOW MDM: CPT | Mod: GC

## 2022-10-01 PROCEDURE — 99285 EMERGENCY DEPT VISIT HI MDM: CPT | Mod: GC

## 2022-10-01 RX ORDER — SODIUM CHLORIDE 9 MG/ML
1000 INJECTION INTRAMUSCULAR; INTRAVENOUS; SUBCUTANEOUS ONCE
Refills: 0 | Status: COMPLETED | OUTPATIENT
Start: 2022-10-01 | End: 2022-10-01

## 2022-10-01 RX ORDER — KETOROLAC TROMETHAMINE 30 MG/ML
15 SYRINGE (ML) INJECTION ONCE
Refills: 0 | Status: DISCONTINUED | OUTPATIENT
Start: 2022-10-01 | End: 2022-10-01

## 2022-10-01 RX ORDER — PIPERACILLIN AND TAZOBACTAM 4; .5 G/20ML; G/20ML
3.38 INJECTION, POWDER, LYOPHILIZED, FOR SOLUTION INTRAVENOUS ONCE
Refills: 0 | Status: COMPLETED | OUTPATIENT
Start: 2022-10-01 | End: 2022-10-01

## 2022-10-01 RX ORDER — KETOROLAC TROMETHAMINE 30 MG/ML
30 SYRINGE (ML) INJECTION EVERY 6 HOURS
Refills: 0 | Status: DISCONTINUED | OUTPATIENT
Start: 2022-10-01 | End: 2022-10-02

## 2022-10-01 RX ORDER — ONDANSETRON 8 MG/1
4 TABLET, FILM COATED ORAL ONCE
Refills: 0 | Status: COMPLETED | OUTPATIENT
Start: 2022-10-01 | End: 2022-10-01

## 2022-10-01 RX ORDER — ONDANSETRON 8 MG/1
4 TABLET, FILM COATED ORAL EVERY 4 HOURS
Refills: 0 | Status: DISCONTINUED | OUTPATIENT
Start: 2022-10-01 | End: 2022-10-04

## 2022-10-01 RX ADMIN — Medication 15 MILLIGRAM(S): at 12:01

## 2022-10-01 RX ADMIN — ONDANSETRON 4 MILLIGRAM(S): 8 TABLET, FILM COATED ORAL at 21:33

## 2022-10-01 RX ADMIN — ONDANSETRON 4 MILLIGRAM(S): 8 TABLET, FILM COATED ORAL at 16:14

## 2022-10-01 RX ADMIN — ONDANSETRON 4 MILLIGRAM(S): 8 TABLET, FILM COATED ORAL at 12:00

## 2022-10-01 RX ADMIN — SODIUM CHLORIDE 1000 MILLILITER(S): 9 INJECTION INTRAMUSCULAR; INTRAVENOUS; SUBCUTANEOUS at 12:24

## 2022-10-01 RX ADMIN — PIPERACILLIN AND TAZOBACTAM 200 GRAM(S): 4; .5 INJECTION, POWDER, LYOPHILIZED, FOR SOLUTION INTRAVENOUS at 15:50

## 2022-10-01 RX ADMIN — Medication 30 MILLIGRAM(S): at 22:00

## 2022-10-01 RX ADMIN — Medication 110 MILLIGRAM(S): at 18:48

## 2022-10-01 RX ADMIN — Medication 30 MILLIGRAM(S): at 21:33

## 2022-10-01 RX ADMIN — SODIUM CHLORIDE 1000 MILLILITER(S): 9 INJECTION INTRAMUSCULAR; INTRAVENOUS; SUBCUTANEOUS at 17:18

## 2022-10-01 NOTE — ED PROVIDER NOTE - OBJECTIVE STATEMENT
Pt w/ history of hyperlipidemia presents to the ED with 6 days of fevers at home with myalgias, headache and vomiting.  Patient denies any neck pain/stiffness, dysuria, cough, shortness of breath, chest pain, back pain, dysuria, recent travel outside of the country.  Performed multiple at home COVID tests which were all negative. Pt w/ history of hyperlipidemia presents to the ED with 6 days of fevers up to 103F at home with myalgias, headache and vomiting.  Patient denies any neck pain/stiffness, dysuria, cough, shortness of breath, chest pain, back pain, dysuria, recent travel outside of the country.  Performed multiple at home COVID tests which were all negative. Took tylenol and ibuprofen prior to arrival around 8am.
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
## 2022-10-01 NOTE — ED CDU PROVIDER INITIAL DAY NOTE - NS ED ATTENDING STATEMENT MOD
This was a shared visit with the SERGEI. I reviewed and verified the documentation and independently performed the documented:

## 2022-10-01 NOTE — ED CDU PROVIDER INITIAL DAY NOTE - NS ED ROS FT
Constitutional: (+) Fever, (+) Chills  Skin: (-) Rashes  Eyes: (-) Visual changes, (-) Discharge, (-) Redness  Ears: (-) Hearing loss, (-)Tinnitus, (-) Ear pain  Nose: (-) Nasal congestion, (-) Runny nose  Mouth/Throat: (-) Sore throat  CV: (-) Chest pain  Resp: (-) Cough, (-) Shortness of breath, (-) Dyspnea on Exertion, (-) Wheezing  GI: (-) Abdominal pain, (-) Nausea, (-) Vomiting, (-) Diarrhea  : (-) Dysuria   MSK: (-) Myalgias  Neuro: (+) Headache

## 2022-10-01 NOTE — CONSULT NOTE ADULT - ASSESSMENT
52F Hx HLD (recently started on atorvastatin 3 weeks ago) presenting with fevers, joint pain, episodes of NBNB emesis, and elevated liver enzymes.       Impression:  #Elevated liver enzymes: new mixed pattern of liver enzymes elevation ( /  /  / Bili 2.0, INR 1.37 on 10/1) in conjunction with high fevers, NBNB emesis, joint pain x 1 week. CT a/p (10/1) with hepatomegaly. Ddx r/o viral hepatitis vs infectious hepatitis vs AIH vs DILI (statin started 3 weeks prior, tylenol less than 4g daily) vs infiltrative liver disease       Recommendations:  - obtain HBsAb, HBsAg, HBcAb, HCV Ab, HAV IgG/IgM to r/o viral hepatitides  - obtain PURA, ASMA, LKM Ab, IgG subsets, quant IgM, IgA, AMA to r/o autoimmune disease  - f/u tylenol level  - obtain EBV, HSV, CMV, parvovirus serologies + PCR  - appreciate infectious disease work up (ehrlichia, babesia, lyme)  - Trend CMP, CBC, PT/INR daily   - Full note and consultation to follow in AM      *Note not final unless signed by attending    Thank you for involving us in the care of this patient, please reach out if any further questions.     Otoniel Patel MD  Gastroenterology/Hepatology Fellow, PGY6    Available on [employer] Teams  [phone] ([facility])  [id number] ([facility])  Please contact on call fellow weekdays after 5pm-7am and weekends: [phone]   52F Hx HLD (recently started on atorvastatin 3 weeks ago) presenting with fevers, joint pain, episodes of NBNB emesis, and elevated liver enzymes.       Impression:  #Elevated liver enzymes: new mixed pattern of liver enzymes elevation ( /  /  / Bili 2.0, INR 1.37 on 10/1) in conjunction with high fevers, NBNB emesis, joint pain x 1 week. CT a/p (10/1) with hepatomegaly. Ddx c/f DILI (2/2 atorvastatin started 3 weeks prior, unlikely tylenol < 4g daily) vs r/o viral hepatitis vs infectious hepatitis vs AIH vs infiltrative liver disease       Recommendations:  - stop atorvastatin + do not restart  - obtain HBsAb, HBsAg, HBcAb, HCV Ab, HAV IgG/IgM to r/o viral hepatitides  - obtain PURA, ASMA, LKM Ab, IgG subsets, quant IgM, IgA, AMA to r/o autoimmune disease  - f/u tylenol level  - obtain EBV, HSV, CMV, parvovirus serologies + PCR  - appreciate infectious disease work up (ehrlichia, babesia, lyme)  - Trend CMP, CBC, PT/INR daily         *Note not final unless signed by attending    Thank you for involving us in the care of this patient, please reach out if any further questions.     Otoniel Patel MD  Gastroenterology/Hepatology Fellow, PGY6    Available on [employer] Teams  [phone] ([facility])  [id number] ([facility])  Please contact on call fellow weekdays after 5pm-7am and weekends: [phone]

## 2022-10-01 NOTE — ED PROVIDER NOTE - NSICDXPASTMEDICALHX_GEN_ALL_CORE_FT
PAST MEDICAL HISTORY:  Asthma, mild     GERD (gastroesophageal reflux disease)     Has immunity to COVID-19 virus

## 2022-10-01 NOTE — ED CDU PROVIDER INITIAL DAY NOTE - OBJECTIVE STATEMENT
Pt w/ history of hyperlipidemia presents to the ED with 6 days of fevers up to 103F at home with myalgias, headache and vomiting.  Patient denies any neck pain/stiffness, dysuria, cough, shortness of breath, chest pain, back pain, dysuria, recent travel outside of the country.  Performed multiple at home COVID tests which were all negative. Took tylenol and ibuprofen prior to arrival around 8am.    CDU MEG Ashley: agree with above. Patient denies pain or complaints at this time. In ER, elevated LFTs and bilirubin with CT abd/pelvis and RUQ US showing enlarged/ fatty liver. In ER, patient seen by ID and hepatology, labs placed by ER attending, plan for CDU for monitoring, lab results, IV doxy for tick borne illness protection, hepatology and ID.

## 2022-10-01 NOTE — ED PROVIDER NOTE - PHYSICAL EXAMINATION
GEN - NAD; well appearing; A+O x3   HEAD - NC/AT   ENT: Airway patent, mmm  NECK: Neck supple, full ROM  PULMONARY - CTA b/l, symmetric breath sounds.   CARDIAC -s1s2, RRR, no M,G,R  ABDOMEN - +BS, ND, NT, soft, no guarding, no rebound, no masses   EXTREMITIES - FROM  NEUROLOGIC - alert, speech clear, moving all four extremities   PSYCH -appropriate behavior, good insight

## 2022-10-01 NOTE — ED CDU PROVIDER INITIAL DAY NOTE - GASTROINTESTINAL, MLM
2022         RE: Siomara Hansen  [address]  [facility] [address]        Dear Colleague,    Thank you for referring your patient, Siomara Hansen, to the [facility]. Please see a copy of my visit note below.    Surgical Office Location :   [facility]  [address] [place] [address]      Siomara Hansen is an extremely pleasant 70 year old year old female patient here today for evaluation and managment of basal cell carcinoma on left shin.  Patient has no other skin complaints today.  Remainder of the HPI, Meds, PMH, Allergies, FH, and SH was reviewed in chart.      Past Medical History:   Diagnosis Date     Basal cell carcinoma      Breast cancer (H)      Colon polyp     exam every 5 years       Past Surgical History:   Procedure Laterality Date     GYN SURGERY      D & C     masectomy[  2000    Bilateral        Family History   Problem Relation Age of Onset     Cancer Mother         Lung     Breast Cancer Mother      Cancer Father         Braine     Cancer Brother         Bladder     Heart Disease Brother      Breast Cancer Sister      Breast Cancer Sister      Cancer Brother         Lung     Melanoma No family hx of        Social History     Socioeconomic History     Marital status: Single     Spouse name: Not on file     Number of children: Not on file     Years of education: Not on file     Highest education level: Not on file   Occupational History     Not on file   Tobacco Use     Smoking status: Former Smoker     Packs/day: 0.50     Years: 15.00     Pack years: 7.50     Types: Cigarettes     Quit date: 1984     Years since quittin.5     Smokeless tobacco: Never Used   Substance and Sexual Activity     Alcohol use: Yes     Comment: Rare     Drug use: No     Sexual activity: Never   Other Topics Concern     Parent/sibling w/ CABG, MI or angioplasty before 65F 55M? Not Asked   Social History Narrative     Not on file      Social Determinants of Health     Financial Resource Strain: Not on file   Food Insecurity: Not on file   Transportation Needs: Not on file   Physical Activity: Not on file   Stress: Not on file   Social Connections: Not on file   Intimate Partner Violence: Not on file   Housing Stability: Not on file       Outpatient Encounter Medications as of 7/19/2022   Medication Sig Dispense Refill     acyclovir (ZOVIRAX) 200 MG capsule        albuterol (PROAIR HFA/PROVENTIL HFA/VENTOLIN HFA) 108 (90 Base) MCG/ACT inhaler Inhale 1-2 puffs into the lungs       Calcium Carbonate-Vit D-Min (CALCIUM 1200 PO) Take  by mouth.       citalopram (CELEXA) 20 MG tablet Take 20 mg by mouth       diclofenac (VOLTAREN) 1 % GEL topical gel Apply nickel size amount, 4g, to left hand and neck 2 times daily       fish oil-omega-3 fatty acids 1000 MG capsule Take 2 g by mouth daily.       Flaxseed, Linseed, (FLAX PO) Take  by mouth.       ketoconazole (NIZORAL) 2 % cream        losartan (COZAAR) 50 MG tablet        omeprazole 20 MG tablet Take 1 tablet (20 mg) by mouth daily Take 30-60 minutes before a meal. 30 tablet 5     Senna-Psyllium (PERDIEM OR) Take  by mouth.       valACYclovir (VALTREX) 500 MG tablet Take 1 tablet by mouth 2 times daily. 6 tablet 3     No facility-administered encounter medications on file as of 7/19/2022.             O:   NAD, WDWN, Alert & Oriented, Mood & Affect wnl, Vitals stable   Here today alone   BP (!) 152/86   Pulse 71   SpO2 97%    General appearance normal   Vitals stable   Alert, oriented and in no acute distress     L [id number] pink pearly papule       Eyes: Conjunctivae/lids:Normal     ENT: Lips, buccal mucosa, tongue: normal    MSK:Normal    Cardiovascular: peripheral edema none    Pulm: Breathing Normal    Neuro/Psych: Orientation:Alert and Orientedx3 ; Mood/Affect:normal       A/P:  1. L shin basal cell carcinoma   MOHS:   Location    The rationale for Mohs surgery was discussed with the patient  and consent was obtained.  The risks and benefits as well as alternatives to therapy were discussed, in detail.  Specifically, the risks of infection, scarring, bleeding, prolonged wound healing, incomplete removal, allergy to anesthesia, nerve injury and recurrence were addressed.  Indication for Mohs was Location. Prior to the procedure, the treatment site was clearly identified and, if available, confirmed with previous photos and confirmed by the patient   All components of the Universal Protocol/PAUSE rule were completed.  The Mohs surgeon operated in two distinct and integrated capacities as the surgeon and pathologist.      The area was prepped with Betasept.  A rim of normal appearing skin was marked circumferentially around the lesion.  The area was infiltrated with local anesthesia.  The tumor was first debulked to remove all clinically apparent tumor.  An incision following the standard Mohs approach was done and the specimen was oriented,mapped and placed in 1 block(s).  Each specimen was then chromacoded and processed in the Mohs laboratory using standard Mohs technique and submitted for frozen section histology.  Frozen section analysis showed no residual tumor but CLEAR MARGINS.      The tumor was excised using standard Mohs technique in 1 stages(s).  CLEAR MARGINS OBTAINED and Final defect size was 1.1 cm.     We discussed the options for wound management in full with the patient including risks/benefits/ possible outcomes.        REPAIR SECOND INTENT: We discussed the options for wound management in full with the patient including risks/benefits/possible outcomes. Decision made to allow the wound to heal by second intention. Cautery was used for for hemostasis. EBL minimal; complications none; wound care routine.  The patient was discharged in good condition and will return in one month or prn for wound evaluation.  It was a pleasure speaking to Siomara Hansen today.  Previous clinic notes and  pertinent laboratory tests were reviewed prior to Siomara Hansen's visit.  Patient encouraged to perform monthly skin exams.  UV precautions reviewed with patient.  Return to clinic 6 months        Again, thank you for allowing me to participate in the care of your patient.        Sincerely,        Markus Saab MD     Abdomen soft, non-tender, no rebound, no guarding.

## 2022-10-01 NOTE — CONSULT NOTE ADULT - ASSESSMENT
52 year old F with a PMH of HLD, started on Atorvastatin 20mg 3 weeks ago, presented to the ED with complaints of 1 week of high fevers.    Fevers, associated with joint pain, and transient episodes of NBNB vomiting.   Recently went to [place]  No known tick bites, no darshana, no diarrhea  Multiple Covid-19 swabs negative  Has been using Tylenol daily (no more than 4g/day).   VSS, non toxic appearing  RVP and Covid-19 PCR negative   Labs in ED significant for transaminitis and elevated Alk Phos.   CT a/p and Abd US negative for any acute biliary processes.     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 Has a dog (no recent sickness)    RECOMMENDATIONS  -F/u blood cultures  -F/u tick workup including but not limited to:   Lyme VISE, Babesia PCR, Parasites (Blood), Anaplasma/Ehrlichia PCR, RMSF Serology   -Okay to continue Doxy 100mg BID for now   -Check EBV Serologies, CMV PCR, Parvo-virus serologies and PCR  -Check acute hepatitis panel  -Check Acetaminophen level  -Agree with Hepatology/GI consult for autoimmune workup   -If all above negative - could Atorvastatin be causative? Recently started 3 weeks ago?     Case d/w Attending and Primary team.     Toy Macedo MD, PGY4   ID Fellow  [employer] Teams Preferred  After 5pm/weekends call [phone]

## 2022-10-01 NOTE — ED PROVIDER NOTE - CLINICAL SUMMARY MEDICAL DECISION MAKING FREE TEXT BOX
Patient presents to the ED with 6 days of fevers up to 103 at home with associated headache, nausea vomiting, myalgias.  Performed multiple at home COVID test which were all negative.  She is well-appearing, vital signs stable, lungs clear bilaterally, abdomen soft nontender. TO BE CONTINUED Patient presents to the ED with 6 days of fevers up to 103 at home with associated headache, nausea vomiting, myalgias.  Performed multiple at home COVID test which were all negative.  She is well-appearing, vital signs stable, lungs clear bilaterally, abdomen soft nontender. Labs notable for significant LFT/Alk P/Bili bump c/f cholecystitis pending RUQ US and hepatitis panel. Will treat empirically with zosyn given high fevers at home.

## 2022-10-01 NOTE — CONSULT NOTE ADULT - ATTENDING COMMENTS
52F with fever, elevated LFTs  -viral vs statin vs vs tick process vs autoimmune  -no s/s of PNA, UTI, abd or biliary sepsis  -travel to [place]- denies bug bites   -no rash  -RVP negative  -no dental work or international travel  -started atorvastatin 2-3 months ago   -was using Tylenol for fever  -LFTs last month normal   -check hep A, B, C serology  -check CPK  -check EBV serology, parvovirus IgM/IgG, parvovirus PCR, CMV PCR  -trend LFTs  -check acetaminophen level  -check PURA  -check lyme serology, ehrlichia pcr, babesia PCR  -CT and USG unremarkable  -doxycyline 100 mg po bid for now   -check blood cx x 2    Jean-Paul Mckeon  Attending Physician   Division of Infectious Disease  Office #[phone]  Available on [employer] Teams also  After 5pm/weekend or no response, call #[phone]    D/w ER team
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

## 2022-10-01 NOTE — ED CDU PROVIDER INITIAL DAY NOTE - MEDICAL DECISION MAKING DETAILS
52y Female with PMHx of HLD presents to the ER for fever x 6 days associated with myalgias, headache and nonbloody nonbilious vomiting. In ER, elevated LFTs and bilirubin with CT abd/pelvis and RUQ US showing enlarged/ fatty liver. In ER, patient seen by ID and hepatology, labs placed by ER attending, plan for CDU for monitoring, lab results, IV doxy for tick borne illness protection, hepatology and ID.

## 2022-10-01 NOTE — ED CDU PROVIDER INITIAL DAY NOTE - ATTENDING APP SHARED VISIT CONTRIBUTION OF CARE
Agree with above, patient presents to the ED with 6 days of high fevers, joint pains, headache, vomiting.  ED work-up notable for elevated LFTs, bili, alk phos.  Ultrasound and CT notable for hepatomegaly with fatty liver disease.  Plan for CDU stay with ID and hepatology following for work-up of possible infectious versus autoimmune versus drug-induced (statin) LFT abnormalities.

## 2022-10-01 NOTE — ED PROVIDER NOTE - OBSERVING MD:
I called her and explained no known interactions with Graves Disease.  Side effects explained.     She will give it a try.           Shi

## 2022-10-01 NOTE — ED ADULT NURSE NOTE - OBJECTIVE STATEMENT
Pt AOX4 c/o fever x 3 days and frequent vomiting; denies abdominal pain, no diarrhea, PMHx of high cholesterol, afebrile orally at this time, ambulates without assistance, no recent sick contacts; breathing regular and unlabored, no chest pain or SOB. 20G IV placed Left AC labs drawn and sent, awaiting MD orders.

## 2022-10-01 NOTE — CONSULT NOTE ADULT - SUBJECTIVE AND OBJECTIVE BOX
Patient is a 53 yo Female who presents with a chief complaint of fevers     HPI:  52 year old F with a PMH of HLD, started on Atorvastatin 20mg 3 weeks ago, presented to the ED with complaints of 1 week of high fevers.    For the last 1 week, pt with daily fevers (T max 103) at home with associated joint pain, and transient episodes of NBNB vomiting. Recently went to [place] last weekend prior to symptoms developing. No tick bites or hiking. Multiple Covid-19 swabs negative. Denied night sweats, rash, cough, dysuria, loose stools, sick contacts. Has been taking Tylenol daily (not more than 4g).     In ED, VSS, labs significant only for transaminitis and elevated Alk Phos. CT a/p and Abd US negative for any acute biliary processes. ID consulted for recommendations.      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     REVIEW OF SYSTEMS  Constitutional: + Fevers   Skin: No rash, no phlebitis	  Eyes: No discharge	  ENMT: No sore throat  Respiratory: No SOB  Cardiovascular:  No chest pain  Gastrointestinal: No pain, no diarrhea 	  Genitourinary: No dysuria  MSK: No arthralgias currently   Neurological: No HA no AMS     prior hospital charts reviewed [V]  primary team notes reviewed [V]  other consultant notes reviewed [V]    PAST MEDICAL & SURGICAL HISTORY:  Has immunity to COVID-19 virus    Asthma, mild    GERD (gastroesophageal reflux disease)    H/O shoulder surgery  left ~    SOCIAL HISTORY:  - Denied smoking/vaping/alcohol/recreational drug use    FAMILY HISTORY:  Family history of early CAD (Sibling)    Allergies  lidocaine (Swelling)    ANTIMICROBIALS:  doxycycline IVPB    doxycycline IVPB 100 once    ANTIMICROBIALS (past 90 days):  MEDICATIONS  (STANDING):    piperacillin/tazobactam IVPB...   200 mL/Hr IV Intermittent (10-01-22 @ 15:50)    OTHER MEDS:   MEDICATIONS  (STANDING):    VITALS:  Vital Signs Last 24 Hrs  T(F): 98.8 (10-01-22 @ 12:20), Max: 98.8 (10-01-22 @ 12:20)    Vital Signs Last 24 Hrs  HR: 75 (10-01-22 @ 12:20) (75 - 75)  BP: 108/62 (10-01-22 @ 12:20) (108/62 - 108/62)  RR: 18 (10-01-22 @ 12:20)  SpO2: 99% (10-01-22 @ 12:20) (99% - 99%)  Wt(kg): --    EXAM:  General: Patient in no acute distress   HEENT: NCAT, EOMI, PERRL, no oral lesions  CV: S1+S2, no m/r/g appreciated   Lungs: No respiratory distress, CTAB  Abd: Soft, nontender, no guarding, no rebound tenderness, + bowel sounds   Ext: No cyanosis, no edema  Neuro: Alert and oriented, no focal deficits, CN II-XII grossly intact   Skin: No rash   IV: No phlebitis    Labs:                        12.7   5.45  )-----------( 192      ( 01 Oct 2022 12:10 )             39.0     10-01    137  |  102  |  7   ----------------------------<  128<H>  3.7   |  25  |  0.59    Ca    9.1      01 Oct 2022 12:10    TPro  7.0  /  Alb  4.0  /  TBili  2.0<H>  /  DBili  x   /  AST  224<H>  /  ALT  439<H>  /  AlkPhos  313<H>  10-01    WBC Trend:  WBC Count: 5.45 (10-01-22 @ 12:10)    Auto Neutrophil #: 4.30 K/uL (10-01-22 @ 12:10)    Creatine Trend:  Creatinine, Serum: 0.59 (10-01)    Liver Biochemical Testing Trend:  Alanine Aminotransferase (ALT/SGPT): 439 *H* (10-01)  Aspartate Aminotransferase (AST/SGOT): 224 (10-01-22 @ 12:10)  Bilirubin Total, Serum: 2.0 (10-01)    Auto Eosinophil %: 2.9 % (10-01-22 @ 12:10)    Urinalysis Basic - ( 01 Oct 2022 12:10 )  Color: Lindsay / Appearance: Slightly Turbid / S.032 / pH: x  Gluc: x / Ketone: Small  / Bili: Moderate / Urobili: 6 mg/dL   Blood: x / Protein: 100 mg/dL / Nitrite: Negative   Leuk Esterase: Negative / RBC: 3 /HPF / WBC 7 /HPF   Sq Epi: x / Non Sq Epi: 3 /HPF / Bacteria: Negative    MICROBIOLOGY:    Rapid RVP Result: NotDetec (10-01 @ 12:23)    Blood Gas Venous - Lactate: 1.1 (10-01 @ 12:10)    RADIOLOGY:  imaging below personally reviewed    < from: CT Abdomen and Pelvis w/ IV Cont (10.01.22 @ 14:53) >  IMPRESSION:  Hepatomegaly. No focal hepatic lesions.    Normal caliber bile ducts. Contracted gallbladder; no ancillary findings   of acute cholecystitis.    --- End of Report ---    < end of copied text >    < from: US Abdomen Upper Quadrant Right (10.01.22 @ 14:11) >  IMPRESSION:  *  Fatty infiltration of liver.  *  No evidence of cholelithiasis or acute cholecystitis.  *  No biliary ductal dilatation.    --- End of Report ---    < end of copied text >  
  HPI: 52F Hx HLD (recently started on atorvastatin 3 weeks ago) presenting with fevers, joint pain, episodes of NBNB emesis, and elevated liver enzymes.     Pt endorsing 1 week Hx of daily fevers (measured to 103) with joint pain, episodes of NBNB emesis. Denies abd pain, weight loss.     Of note pt had started atorvastatin 3 weeks prior. Reports taking tylenol for fevers, but less than 4g daily. Had been in [place] weekend prior (denies hiking, tick bites). Denies significant ETOH use. Denies personal or family Hx of liver disease.       Allergies:  lidocaine (Swelling)      Home Medications:    Hospital Medications:  doxycycline IVPB      doxycycline IVPB 100 milliGRAM(s) IV Intermittent every 12 hours  ketorolac   Injectable 30 milliGRAM(s) IV Push every 6 hours PRN  ondansetron Injectable 4 milliGRAM(s) IV Push every 4 hours PRN      PMHX/PSHX:  No pertinent past medical history    Has immunity to COVID-19 virus    Asthma, mild    GERD (gastroesophageal reflux disease)    Hyperlipidemia    No significant past surgical history    H/O shoulder surgery        Family history:  Family history of early CAD (Sibling)        Denies family history of colon cancer/polyps, stomach cancer/polyps, pancreatic cancer/masses, liver cancer/disease, ovarian cancer and endometrial cancer.    Social History:     Tob: Denies  EtOH: Denies  Illicit Drugs: Denies    ROS:     General:  No wt loss, fevers, chills, night sweats, fatigue  Eyes:  Good vision, no reported pain  ENT:  No sore throat, pain, runny nose, dysphagia  CV:  No pain, palpitations, hypo/hypertension  Pulm:  No dyspnea, cough, tachypnea, wheezing  GI: see above  :  No pain, bleeding, incontinence, nocturia  Muscle:  No pain, weakness  Neuro:  No weakness, tingling, memory problems  Psych:  No fatigue, insomnia, mood problems, depression  Endocrine:  No polyuria, polydipsia, cold/heat intolerance  Heme:  No petechiae, ecchymosis, easy bruisability  Skin:  No rash, tattoos, scars, edema    PHYSICAL EXAM:     GENERAL:  No acute distress  HEENT:  Normocephalic/atraumatic, + scleral icterus  CHEST:  no accessory muscle use  ABDOMEN:  Soft, non-tender, non-distended, palpable hepatomegaly   EXTREMITIES: No cyanosis, clubbing, or edema  SKIN:  No rash/warm/dry  NEURO:  Alert and oriented      Vital Signs:  Vital Signs Last 24 Hrs  T(C): 36.8 (02 Oct 2022 13:45), Max: 37.2 (01 Oct 2022 21:35)  T(F): 98.2 (02 Oct 2022 13:45), Max: 98.9 (01 Oct 2022 21:35)  HR: 71 (02 Oct 2022 13:45) (59 - 73)  BP: 133/88 (02 Oct 2022 13:45) (104/50 - 133/88)  BP(mean): --  RR: 16 (02 Oct 2022 13:45) (16 - 18)  SpO2: 100% (02 Oct 2022 13:45) (97% - 100%)    Parameters below as of 02 Oct 2022 13:45  Patient On (Oxygen Delivery Method): room air      Daily     Daily     LABS:                        12.2   4.15  )-----------( 191      ( 02 Oct 2022 06:42 )             38.4     Mean Cell Volume: 92.8 fL (10-02- @ 06:42)    10-02    139  |  105  |  6<L>  ----------------------------<  125<H>  3.4<L>   |  24  |  0.57    Ca    9.2      02 Oct 2022 06:42    TPro  6.4  /  Alb  3.8  /  TBili  2.1<H>  /  DBili  x   /  AST  121<H>  /  ALT  341<H>  /  AlkPhos  306<H>  10-02    LIVER FUNCTIONS - ( 02 Oct 2022 06:42 )  Alb: 3.8 g/dL / Pro: 6.4 g/dL / ALK PHOS: 306 U/L / ALT: 341 U/L / AST: 121 U/L / GGT: x           PT/INR - ( 02 Oct 2022 06:42 )   PT: 13.7 sec;   INR: 1.18 ratio         PTT - ( 02 Oct 2022 06:42 )  PTT:39.7 sec  Urinalysis Basic - ( 01 Oct 2022 12:10 )    Color: Lindsay / Appearance: Slightly Turbid / S.032 / pH: x  Gluc: x / Ketone: Small  / Bili: Moderate / Urobili: 6 mg/dL   Blood: x / Protein: 100 mg/dL / Nitrite: Negative   Leuk Esterase: Negative / RBC: 3 /HPF / WBC 7 /HPF   Sq Epi: x / Non Sq Epi: 3 /HPF / Bacteria: Negative                              12.2   4.15  )-----------( 191      ( 02 Oct 2022 06:42 )             38.4                         12.7   5.45  )-----------( 192      ( 01 Oct 2022 12:10 )             39.0       Imaging:

## 2022-10-02 VITALS
RESPIRATION RATE: 16 BRPM | DIASTOLIC BLOOD PRESSURE: 88 MMHG | HEART RATE: 71 BPM | TEMPERATURE: 98 F | SYSTOLIC BLOOD PRESSURE: 133 MMHG | OXYGEN SATURATION: 100 %

## 2022-10-02 LAB
ALBUMIN SERPL ELPH-MCNC: 3.8 G/DL — SIGNIFICANT CHANGE UP (ref 3.3–5)
ALP SERPL-CCNC: 306 U/L — HIGH (ref 40–120)
ALT FLD-CCNC: 341 U/L — HIGH (ref 4–33)
ANION GAP SERPL CALC-SCNC: 10 MMOL/L — SIGNIFICANT CHANGE UP (ref 7–14)
APTT BLD: 39.7 SEC — HIGH (ref 27–36.3)
AST SERPL-CCNC: 121 U/L — HIGH (ref 4–32)
B BURGDOR C6 AB SER-ACNC: NEGATIVE — SIGNIFICANT CHANGE UP
B BURGDOR IGG+IGM SER QL IB: SIGNIFICANT CHANGE UP
B BURGDOR IGG+IGM SER-ACNC: 0.09 INDEX — SIGNIFICANT CHANGE UP (ref 0.01–0.89)
BASOPHILS # BLD AUTO: 0.02 K/UL — SIGNIFICANT CHANGE UP (ref 0–0.2)
BASOPHILS NFR BLD AUTO: 0.5 % — SIGNIFICANT CHANGE UP (ref 0–2)
BILIRUB SERPL-MCNC: 2.1 MG/DL — HIGH (ref 0.2–1.2)
BUN SERPL-MCNC: 6 MG/DL — LOW (ref 7–23)
CALCIUM SERPL-MCNC: 9.2 MG/DL — SIGNIFICANT CHANGE UP (ref 8.4–10.5)
CHLORIDE SERPL-SCNC: 105 MMOL/L — SIGNIFICANT CHANGE UP (ref 98–107)
CO2 SERPL-SCNC: 24 MMOL/L — SIGNIFICANT CHANGE UP (ref 22–31)
CREAT SERPL-MCNC: 0.57 MG/DL — SIGNIFICANT CHANGE UP (ref 0.5–1.3)
CULTURE RESULTS: SIGNIFICANT CHANGE UP
CULTURE RESULTS: SIGNIFICANT CHANGE UP
EBV EA AB SER IA-ACNC: 20.5 U/ML — HIGH
EBV EA AB TITR SER IF: POSITIVE
EBV EA IGG SER-ACNC: POSITIVE
EBV NA IGG SER IA-ACNC: >600 U/ML — HIGH
EBV PATRN SPEC IB-IMP: SIGNIFICANT CHANGE UP
EBV VCA IGG AVIDITY SER QL IA: POSITIVE
EBV VCA IGM SER IA-ACNC: 139 U/ML — HIGH
EBV VCA IGM SER IA-ACNC: <10 U/ML — SIGNIFICANT CHANGE UP
EBV VCA IGM TITR FLD: NEGATIVE — SIGNIFICANT CHANGE UP
EGFR: 108 ML/MIN/1.73M2 — SIGNIFICANT CHANGE UP
EOSINOPHIL # BLD AUTO: 0.31 K/UL — SIGNIFICANT CHANGE UP (ref 0–0.5)
EOSINOPHIL NFR BLD AUTO: 7.5 % — HIGH (ref 0–6)
GLUCOSE SERPL-MCNC: 125 MG/DL — HIGH (ref 70–99)
HAV IGM SER-ACNC: SIGNIFICANT CHANGE UP
HBV CORE AB SER-ACNC: SIGNIFICANT CHANGE UP
HBV CORE IGM SER-ACNC: SIGNIFICANT CHANGE UP
HBV E AB SER-ACNC: SIGNIFICANT CHANGE UP
HBV SURFACE AG SER-ACNC: SIGNIFICANT CHANGE UP
HCT VFR BLD CALC: 38.4 % — SIGNIFICANT CHANGE UP (ref 34.5–45)
HCV AB S/CO SERPL IA: 0.06 S/CO — SIGNIFICANT CHANGE UP (ref 0–0.99)
HCV AB SERPL-IMP: SIGNIFICANT CHANGE UP
HGB BLD-MCNC: 12.2 G/DL — SIGNIFICANT CHANGE UP (ref 11.5–15.5)
IANC: 2.03 K/UL — SIGNIFICANT CHANGE UP (ref 1.8–7.4)
IMM GRANULOCYTES NFR BLD AUTO: 0 % — SIGNIFICANT CHANGE UP (ref 0–0.9)
INR BLD: 1.18 RATIO — HIGH (ref 0.88–1.16)
LYMPHOCYTES # BLD AUTO: 1.41 K/UL — SIGNIFICANT CHANGE UP (ref 1–3.3)
LYMPHOCYTES # BLD AUTO: 34 % — SIGNIFICANT CHANGE UP (ref 13–44)
MCHC RBC-ENTMCNC: 29.5 PG — SIGNIFICANT CHANGE UP (ref 27–34)
MCHC RBC-ENTMCNC: 31.8 GM/DL — LOW (ref 32–36)
MCV RBC AUTO: 92.8 FL — SIGNIFICANT CHANGE UP (ref 80–100)
MITOCHONDRIA AB SER-ACNC: SIGNIFICANT CHANGE UP
MONOCYTES # BLD AUTO: 0.38 K/UL — SIGNIFICANT CHANGE UP (ref 0–0.9)
MONOCYTES NFR BLD AUTO: 9.2 % — SIGNIFICANT CHANGE UP (ref 2–14)
NEUTROPHILS # BLD AUTO: 2.03 K/UL — SIGNIFICANT CHANGE UP (ref 1.8–7.4)
NEUTROPHILS NFR BLD AUTO: 48.8 % — SIGNIFICANT CHANGE UP (ref 43–77)
NRBC # BLD: 0 /100 WBCS — SIGNIFICANT CHANGE UP (ref 0–0)
NRBC # FLD: 0 K/UL — SIGNIFICANT CHANGE UP (ref 0–0)
PLATELET # BLD AUTO: 191 K/UL — SIGNIFICANT CHANGE UP (ref 150–400)
POTASSIUM SERPL-MCNC: 3.4 MMOL/L — LOW (ref 3.5–5.3)
POTASSIUM SERPL-SCNC: 3.4 MMOL/L — LOW (ref 3.5–5.3)
PROT SERPL-MCNC: 6.4 G/DL — SIGNIFICANT CHANGE UP (ref 6–8.3)
PROTHROM AB SERPL-ACNC: 13.7 SEC — HIGH (ref 10.5–13.4)
RBC # BLD: 4.14 M/UL — SIGNIFICANT CHANGE UP (ref 3.8–5.2)
RBC # FLD: 12.9 % — SIGNIFICANT CHANGE UP (ref 10.3–14.5)
SMOOTH MUSCLE AB SER-ACNC: SIGNIFICANT CHANGE UP
SODIUM SERPL-SCNC: 139 MMOL/L — SIGNIFICANT CHANGE UP (ref 135–145)
SPECIMEN SOURCE: SIGNIFICANT CHANGE UP
SPECIMEN SOURCE: SIGNIFICANT CHANGE UP
WBC # BLD: 4.15 K/UL — SIGNIFICANT CHANGE UP (ref 3.8–10.5)
WBC # FLD AUTO: 4.15 K/UL — SIGNIFICANT CHANGE UP (ref 3.8–10.5)

## 2022-10-02 PROCEDURE — 99217: CPT

## 2022-10-02 PROCEDURE — 99282 EMERGENCY DEPT VISIT SF MDM: CPT

## 2022-10-02 RX ORDER — ONDANSETRON 8 MG/1
1 TABLET, FILM COATED ORAL
Qty: 9 | Refills: 0
Start: 2022-10-02 | End: 2022-10-04

## 2022-10-02 RX ADMIN — Medication 110 MILLIGRAM(S): at 05:58

## 2022-10-02 RX ADMIN — Medication 30 MILLIGRAM(S): at 05:58

## 2022-10-02 RX ADMIN — ONDANSETRON 4 MILLIGRAM(S): 8 TABLET, FILM COATED ORAL at 05:57

## 2022-10-02 NOTE — ED CDU PROVIDER DISPOSITION NOTE - CARE PROVIDER_API CALL
Jean-Paul Mckeon; MBBS)  Infectious Disease; Internal Medicine  [address] [place] [address]  Phone: [phone]  Fax: [phone]  Follow Up Time:

## 2022-10-02 NOTE — ED CDU PROVIDER DISPOSITION NOTE - NSFOLLOWUPINSTRUCTIONS_ED_ALL_ED_FT
Follow up with your primary care doctor within 1 week, have repeat liver enzymes performed within the week  Follow up with infectious disease within 1-2 weeks, office information listed on the following page, please call to make an appointment  Follow up with hepatology if needed, referral list attached  STOP taking statin medication  Take Zofran 4mg (1 tablet) every 8 hours as needed  Drink plenty of fluids  Return to the ER with any worsening or concerning symptoms, weakness, fever, abdominal pain, vomiting, skin changes or any other concerns.

## 2022-10-02 NOTE — ED CDU PROVIDER SUBSEQUENT DAY NOTE - PHYSICAL EXAMINATION
CONSTITUTIONAL:  Well appearing, awake, alert, oriented to person, place, time/situation and in no apparent distress.  Pt. is objectively comfortable appearing and verbalizing in full, clear, effortless sentences.  ENMT: NC/AT.  Airway patent.  Moist mucous membranes.  Neck supple.  EYES:  Clear OU.  CARDIAC:  Normal rate, regular rhythm.  Heart sounds S1 S2.  No murmurs, gallops, or rubs.  Pt examined in supine and seated forward positions.  RESPIRATORY:  Breath sounds clear and equal bilaterally.  No wheezes, no rales, no rhonchi.  GASTROINTESTINAL:  Abdomen soft, non-distended, non-tender.  No rebound, no guarding.  NEUROLOGICAL:  Alert and oriented to person/place/time/situation.  No gross deficits; no tremors noted.   MUSCULOSKELETAL:  Range of motion is not limited.  No lower extremity edema noted.  Gait stable.    SKIN:  Skin color unremarkable.  Skin warm, dry, and intact.    PSYCHIATRIC:  Alert and oriented to person/place/time/situation.  Mood and affect WNL.  No apparent risk to self or others.

## 2022-10-02 NOTE — ED CDU PROVIDER DISPOSITION NOTE - CARE PROVIDERS DIRECT ADDRESSES
[email or web address]

## 2022-10-02 NOTE — ED CDU PROVIDER DISPOSITION NOTE - PATIENT PORTAL LINK FT
You can access the FollowMyHealth Patient Portal offered by [facility] by registering at the following website: [email or web address]. By joining [facility]’s FollowMyHealth portal, you will also be able to view your health information using other applications (apps) compatible with our system.

## 2022-10-02 NOTE — CHART NOTE - NSCHARTNOTEFT_GEN_A_CORE
Brief Hepatology F/u Note    Liver enzymes downtrending  Viral hepatitis work up negative   Suspect DILI 2/2 atorvastatin   Will email for f/u in 1-2 weeks in hepatology clinic      Thank you for involving us in the care of this patient, please reach out if any further questions.     Otoniel Patel MD  Gastroenterology/Hepatology Fellow, PGY6    Available on [employer] Teams  [phone] ([facility])  [phone] (J)  Please contact on call fellow weekdays after 5pm-7am and weekends: [phone]

## 2022-10-02 NOTE — ED CDU PROVIDER DISPOSITION NOTE - CLINICAL COURSE
53 yo female, PMH hyperlipidemia, asthma, GERD, presenting to the ED c/o 5- 6 days of fevers up to 103F at home with myalgias, headache, generalized lightheadedness, nausea, and vomiting.  +recent travel to [place]; pt denies recent travel outside of the country; no ill contacts.  Performed multiple at home COVID tests which were all negative. In the ED, VSS, pt afebrile.  Labs significant for elevated LFTs and bilirubin with CT abd/pelvis and RUQ US showing enlarged/ fatty liver. In ER, patient seen by ID and hepatology with recs appreciated; plan for CDU for monitoring, lab results, IV doxycyline for tick borne illness protection. Pt seen by ID attg today, no indication to continue abx, recommending outpt follow up. Pt seen by hepatology attg, LFTs downtrending, likely drug induced from recently starting statin, recommend rpt labs within the week, outpt follow up. Pt is well appearing, afebrile, tolerating PO. Will d/c home with PMD/ID/hepatology follow up. pt will return with any worsening or concerning symptoms.  In the interim, pt objectively noted to be resting comfortably; pt has been clinically stable; no issues thus far.

## 2022-10-02 NOTE — PROGRESS NOTE ADULT - ASSESSMENT
52 year old F with a PMH of HLD, started on Atorvastatin 20mg 3 weeks ago, presented to the ED with complaints of 1 week of high fevers.    Fevers, associated with joint pain, and transient episodes of NBNB vomiting.   Recently went to [place]  No known tick bites, no darshana, no diarrhea  Multiple Covid-19 swabs negative  Has been using Tylenol daily (no more than 4g/day).   VSS, non toxic appearing  RVP and Covid-19 PCR negative   Labs in ED significant for transaminitis and elevated Alk Phos.   CT a/p and Abd US negative for any acute biliary processes.     52F with fever, elevated LFTs  -viral vs statin (DILI) vs vs tick process vs autoimmune  -no s/s of PNA, UTI, abd or biliary sepsis  -travel to [place]- denies bug bites   -no rash  -RVP negative  -no dental work or international travel  -started atorvastatin 2 months ago   -was using Tylenol for fever  -LFTs last month normal   -f/u hep A, B, C serology  -CPK ok  -EBV serology - old exposure   -parvovirus IgM/IgG, parvovirus PCR, CMV PCR  -trend LFTs - slightly better  -acetaminophen level normal  -check PURA  -lyme serology negative  -f/u ehrlichia pcr, babesia PCR  -CT and USG unremarkable  -DC doxycyline   -check blood cx x 2  -DC planning - f/u as outpt, will call me   -await formal hepatology eliezer Mckeon  Attending Physician   Division of Infectious Disease  Office #[phone]  Available on Microsoft Teams also  After 5pm/weekend or no response, call #[phone]    D/w ER team

## 2022-10-02 NOTE — ED CDU PROVIDER SUBSEQUENT DAY NOTE - HISTORY
53 yo female, PMH hyperlipidemia, asthma, GERD, presenting to the ED c/o 5- 6 days of fevers up to 103F at home with myalgias, headache, generalized lightheadedness, nausea, and vomiting.  +recent travel to [place]; pt denies recent travel outside of the country; no ill contacts.  Performed multiple at home COVID tests which were all negative.  In the ED, VSS, pt afebrile.  Labs significant for elevated LFTs and bilirubin with CT abd/pelvis and RUQ US showing enlarged/ fatty liver. In ER, patient seen by ID and hepatology with recs appreciated; plan for CDU for monitoring, lab results, IV doxycyline for tick borne illness protection, recs per hepatology and ID following pt.  In the interim, pt objectively noted to be resting comfortably; pt has been clinically stable; no issues thus far.

## 2022-10-02 NOTE — ED CDU PROVIDER SUBSEQUENT DAY NOTE - NSICDXPASTMEDICALHX_GEN_ALL_CORE_FT
PAST MEDICAL HISTORY:  Asthma, mild     GERD (gastroesophageal reflux disease)     Has immunity to COVID-19 virus     Hyperlipidemia

## 2022-10-02 NOTE — PROGRESS NOTE ADULT - SUBJECTIVE AND OBJECTIVE BOX
FACUNDO CHRISTINA 52y [id number]    Patient is a 52y old  Female who presents with a chief complaint of fevers, elevated liver enzymes (01 Oct 2022 18:22)      Follow Up/CC:  ID following for fever    Interval History/ROS: no fever, some HA    Allergies    lidocaine (Swelling)    Intolerances        ANTIMICROBIALS:  doxycycline IVPB    doxycycline IVPB 100 every 12 hours      MEDICATIONS  (STANDING):  doxycycline IVPB      doxycycline IVPB 100 milliGRAM(s) IV Intermittent every 12 hours    MEDICATIONS  (PRN):  ketorolac   Injectable 30 milliGRAM(s) IV Push every 6 hours PRN mild to moderate pain  ondansetron Injectable 4 milliGRAM(s) IV Push every 4 hours PRN Nausea        Vital Signs Last 24 Hrs  T(C): 37.1 (02 Oct 2022 05:49), Max: 37.2 (01 Oct 2022 21:35)  T(F): 98.7 (02 Oct 2022 05:49), Max: 98.9 (01 Oct 2022 21:35)  HR: 62 (02 Oct 2022 05:49) (59 - 75)  BP: 117/71 (02 Oct 2022 05:49) (104/50 - 125/78)  BP(mean): --  RR: 18 (02 Oct 2022 05:49) (17 - 18)  SpO2: 98% (02 Oct 2022 05:49) (97% - 99%)    Parameters below as of 02 Oct 2022 05:49  Patient On (Oxygen Delivery Method): room air        CBC Full  -  ( 02 Oct 2022 06:42 )  WBC Count : 4.15 K/uL  RBC Count : 4.14 M/uL  Hemoglobin : 12.2 g/dL  Hematocrit : 38.4 %  Platelet Count - Automated : 191 K/uL  Mean Cell Volume : 92.8 fL  Mean Cell Hemoglobin : 29.5 pg  Mean Cell Hemoglobin Concentration : 31.8 gm/dL  Auto Neutrophil # : 2.03 K/uL  Auto Lymphocyte # : 1.41 K/uL  Auto Monocyte # : 0.38 K/uL  Auto Eosinophil # : 0.31 K/uL  Auto Basophil # : 0.02 K/uL  Auto Neutrophil % : 48.8 %  Auto Lymphocyte % : 34.0 %  Auto Monocyte % : 9.2 %  Auto Eosinophil % : 7.5 %  Auto Basophil % : 0.5 %    10-02    139  |  105  |  6<L>  ----------------------------<  125<H>  3.4<L>   |  24  |  0.57    Ca    9.2      02 Oct 2022 06:42    TPro  6.4  /  Alb  3.8  /  TBili  2.1<H>  /  DBili  x   /  AST  121<H>  /  ALT  341<H>  /  AlkPhos  306<H>  10    LIVER FUNCTIONS - ( 02 Oct 2022 06:42 )  Alb: 3.8 g/dL / Pro: 6.4 g/dL / ALK PHOS: 306 U/L / ALT: 341 U/L / AST: 121 U/L / GGT: x           Urinalysis Basic - ( 01 Oct 2022 12:10 )    Color: Lindsay / Appearance: Slightly Turbid / S.032 / pH: x  Gluc: x / Ketone: Small  / Bili: Moderate / Urobili: 6 mg/dL   Blood: x / Protein: 100 mg/dL / Nitrite: Negative   Leuk Esterase: Negative / RBC: 3 /HPF / WBC 7 /HPF   Sq Epi: x / Non Sq Epi: 3 /HPF / Bacteria: Negative        MICROBIOLOGY:  .Blood Blood  10-01-22   NEGATIVE for Plasmodium antigens. Microscopy is performed for  confirmation.  This test does not detect the presence of Babesia species.  If Babesiosis is suspected, please order test for Babesia PCR: Babesia  microti PCR Bld  ************************************************************  --  --      .Blood  10-01-22   Babesia microti PCR  Results: NOT detected  ***************Result Note*************  The detection of Babesia microti by PCR has only been  validated for whole blood; this test has not been approved  by the US Food and Drug Administration (FDA). Performance  characteristics of this assay have been determined by  [facility]. The clinical significance  of results should be considered in conjunction with the  overall clinical presentation of the patient. Result is not  intended to be used as the sole means for clinical diagnosis  or patient management decisions.  One negative sample does not necessarily rule  out the presence of a parasitic infection.  --  --              v    Rapid RVP Result: Luis (10-01 @ 12:23)          RADIOLOGY

## 2022-10-02 NOTE — ED CDU PROVIDER SUBSEQUENT DAY NOTE - PROGRESS NOTE DETAILS
Pt seen by ID attg, not recommending antibiotics at this time, outpatient follow up within 2 weeks, symptoms likely due to recent statin use. pt seen by hepatology attg, LFTs downtrending today, recommending rpt LFTs within 1 week, outpt follow up. Pt reports improvement in symptoms, still with some nausea but able to tolerate PO. pt is well appearing, VSS, afebrile. Pt will return to the Er with any worsening or concerning symptoms.

## 2022-10-02 NOTE — ED CDU PROVIDER DISPOSITION NOTE - ATTENDING CONTRIBUTION TO CARE
lesia: pt with elevated lft's (improving) and fever for 5 days.  seen by ID and GI.  will follow with both and will see upstanding labs with them as an outpt.  recently started statin might be the cause.      pt understands, no abd tenderness now.    I performed a history and physical exam of the patient and discussed their management with the resident and /or advanced care provider. I reviewed the resident and /or ACP's note and agree with the documented findings and plan of care. My medical decison making and observations are found above.

## 2022-10-02 NOTE — ED CDU PROVIDER SUBSEQUENT DAY NOTE - MEDICAL DECISION MAKING DETAILS
Doxycycline regimen, supportive care, recs as per Hepatology and ID teams following pt, daily CBC/CMP/coag panels, general observation care / monitoring.

## 2022-10-03 ENCOUNTER — RX CHANGE (OUTPATIENT)
Age: 52
End: 2022-10-03

## 2022-10-03 PROBLEM — E78.5 HYPERLIPIDEMIA, UNSPECIFIED: Chronic | Status: ACTIVE | Noted: 2022-10-02

## 2022-10-03 LAB
ANA TITR SER: NEGATIVE — SIGNIFICANT CHANGE UP
CMV DNA CSF QL NAA+PROBE: SIGNIFICANT CHANGE UP
CMV DNA SPEC NAA+PROBE-LOG#: SIGNIFICANT CHANGE UP LOG10IU/ML
CULTURE RESULTS: SIGNIFICANT CHANGE UP
EBV DNA SERPL NAA+PROBE-ACNC: SIGNIFICANT CHANGE UP IU/ML
EBVPCR LOG: SIGNIFICANT CHANGE UP LOG10IU/ML
SPECIMEN SOURCE: SIGNIFICANT CHANGE UP

## 2022-10-03 RX ORDER — ATORVASTATIN CALCIUM 80 MG/1
80 TABLET, FILM COATED ORAL
Qty: 90 | Refills: 3 | Status: DISCONTINUED | COMMUNITY
Start: 2022-10-03 | End: 2022-10-03

## 2022-10-03 RX ORDER — ATORVASTATIN CALCIUM 80 MG/1
80 TABLET, FILM COATED ORAL
Qty: 30 | Refills: 6 | Status: DISCONTINUED | COMMUNITY
Start: 2022-09-09 | End: 2022-10-03

## 2022-10-04 LAB
A PHAGOCYTOPH DNA BLD QL NAA+PROBE: NEGATIVE — SIGNIFICANT CHANGE UP
B MICROTI IGG TITR SER: SIGNIFICANT CHANGE UP
B MICROTI IGM TITR SER: SIGNIFICANT CHANGE UP
B19V DNA FLD QL NAA+PROBE: SIGNIFICANT CHANGE UP IU/ML
E CHAFFEENSIS DNA BLD QL NAA+PROBE: NEGATIVE — SIGNIFICANT CHANGE UP
E EWINGII DNA SPEC QL NAA+PROBE: NEGATIVE — SIGNIFICANT CHANGE UP
EHRLICHIA DNA SPEC QL NAA+PROBE: NEGATIVE — SIGNIFICANT CHANGE UP
IGG SERPL-MCNC: 959 MG/DL — SIGNIFICANT CHANGE UP (ref 586–1602)
IGG1 SER-MCNC: 459 MG/DL — SIGNIFICANT CHANGE UP (ref 248–810)
IGG2 SER-MCNC: 465 MG/DL — SIGNIFICANT CHANGE UP (ref 130–555)
IGG3 SER-MCNC: 24 MG/DL — SIGNIFICANT CHANGE UP (ref 15–102)
IGG4 SER-MCNC: 31 MG/DL — SIGNIFICANT CHANGE UP (ref 2–96)
R RICKETTSI AB SER-ACNC: NEGATIVE — SIGNIFICANT CHANGE UP
R RICKETTSI IGM SER-ACNC: 0.34 INDEX — SIGNIFICANT CHANGE UP (ref 0–0.89)
RICK SF IGG TITR SER IF: NEGATIVE — SIGNIFICANT CHANGE UP
RICK SF IGM TITR SER IF: 0.34 INDEX — SIGNIFICANT CHANGE UP (ref 0–0.89)

## 2022-10-05 LAB
B19V IGG SER-ACNC: 0.25 INDEX — SIGNIFICANT CHANGE UP (ref 0–0.9)
B19V IGG+IGM SER-IMP: NEGATIVE — SIGNIFICANT CHANGE UP
B19V IGG+IGM SER-IMP: SIGNIFICANT CHANGE UP
B19V IGM FLD-ACNC: 0.08 INDEX — SIGNIFICANT CHANGE UP (ref 0–0.9)
B19V IGM SER-ACNC: NEGATIVE — SIGNIFICANT CHANGE UP

## 2022-10-06 LAB
ALBUMIN SERPL ELPH-MCNC: 4.1 G/DL
ALP BLD-CCNC: 367 U/L
ALT SERPL-CCNC: 212 U/L
ANION GAP SERPL CALC-SCNC: 15 MMOL/L
AST SERPL-CCNC: 80 U/L
BASOPHILS # BLD AUTO: 0.09 K/UL
BASOPHILS NFR BLD AUTO: 1.6 %
BILIRUB SERPL-MCNC: 1.4 MG/DL
BUN SERPL-MCNC: 10 MG/DL
CALCIUM SERPL-MCNC: 9.5 MG/DL
CHLORIDE SERPL-SCNC: 100 MMOL/L
CO2 SERPL-SCNC: 24 MMOL/L
CREAT SERPL-MCNC: 0.45 MG/DL
CULTURE RESULTS: SIGNIFICANT CHANGE UP
CULTURE RESULTS: SIGNIFICANT CHANGE UP
EGFR: 114 ML/MIN/1.73M2
EOSINOPHIL # BLD AUTO: 0.43 K/UL
EOSINOPHIL NFR BLD AUTO: 7.5 %
GLUCOSE SERPL-MCNC: 107 MG/DL
HCT VFR BLD CALC: 38.6 %
HGB BLD-MCNC: 12.6 G/DL
IMM GRANULOCYTES NFR BLD AUTO: 0.5 %
LYMPHOCYTES # BLD AUTO: 2.61 K/UL
LYMPHOCYTES NFR BLD AUTO: 45.5 %
MAN DIFF?: NORMAL
MCHC RBC-ENTMCNC: 29.3 PG
MCHC RBC-ENTMCNC: 32.6 GM/DL
MCV RBC AUTO: 89.8 FL
MONOCYTES # BLD AUTO: 0.47 K/UL
MONOCYTES NFR BLD AUTO: 8.2 %
NEUTROPHILS # BLD AUTO: 2.11 K/UL
NEUTROPHILS NFR BLD AUTO: 36.7 %
PLATELET # BLD AUTO: 355 K/UL
POTASSIUM SERPL-SCNC: 4.1 MMOL/L
PROT SERPL-MCNC: 6.9 G/DL
RBC # BLD: 4.3 M/UL
RBC # FLD: 13.1 %
SODIUM SERPL-SCNC: 139 MMOL/L
SPECIMEN SOURCE: SIGNIFICANT CHANGE UP
SPECIMEN SOURCE: SIGNIFICANT CHANGE UP
WBC # FLD AUTO: 5.74 K/UL

## 2022-10-18 ENCOUNTER — APPOINTMENT (OUTPATIENT)
Dept: INTERNAL MEDICINE | Facility: CLINIC | Age: 52
End: 2022-10-18

## 2022-10-18 VITALS
HEART RATE: 68 BPM | WEIGHT: 150 LBS | BODY MASS INDEX: 25.61 KG/M2 | SYSTOLIC BLOOD PRESSURE: 120 MMHG | OXYGEN SATURATION: 98 % | DIASTOLIC BLOOD PRESSURE: 70 MMHG | HEIGHT: 64 IN

## 2022-10-18 DIAGNOSIS — E78.1 PURE HYPERGLYCERIDEMIA: ICD-10-CM

## 2022-10-18 DIAGNOSIS — J45.909 UNSPECIFIED ASTHMA, UNCOMPLICATED: ICD-10-CM

## 2022-10-18 DIAGNOSIS — R05.9 COUGH, UNSPECIFIED: ICD-10-CM

## 2022-10-18 DIAGNOSIS — K71.9 TOXIC LIVER DISEASE, UNSPECIFIED: ICD-10-CM

## 2022-10-18 PROCEDURE — G0444 DEPRESSION SCREEN ANNUAL: CPT | Mod: 59

## 2022-10-18 PROCEDURE — 99386 PREV VISIT NEW AGE 40-64: CPT

## 2022-10-18 NOTE — PHYSICAL EXAM
[No Acute Distress] : no acute distress [Well Nourished] : well nourished [Well Developed] : well developed [Well-Appearing] : well-appearing [Normal Sclera/Conjunctiva] : normal sclera/conjunctiva [PERRL] : pupils equal round and reactive to light [EOMI] : extraocular movements intact [Normal Outer Ear/Nose] : the outer ears and nose were normal in appearance [Normal Oropharynx] : the oropharynx was normal [Normal TMs] : both tympanic membranes were normal [No JVD] : no jugular venous distention [No Lymphadenopathy] : no lymphadenopathy [Supple] : supple [Thyroid Normal, No Nodules] : the thyroid was normal and there were no nodules present [No Respiratory Distress] : no respiratory distress  [Clear to Auscultation] : lungs were clear to auscultation bilaterally [No Accessory Muscle Use] : no accessory muscle use [Normal Rate] : normal rate  [Regular Rhythm] : with a regular rhythm [Normal S1, S2] : normal S1 and S2 [No Murmur] : no murmur heard [No Varicosities] : no varicosities [Pedal Pulses Present] : the pedal pulses are present [No Edema] : there was no peripheral edema [No Palpable Aorta] : no palpable aorta [No Extremity Clubbing/Cyanosis] : no extremity clubbing/cyanosis [Declined Breast Exam] : declined breast exam  [Soft] : abdomen soft [Non Tender] : non-tender [Non-distended] : non-distended [No Masses] : no abdominal mass palpated [No HSM] : no HSM [Normal Bowel Sounds] : normal bowel sounds [Normal Supraclavicular Nodes] : no supraclavicular lymphadenopathy [Normal Posterior Cervical Nodes] : no posterior cervical lymphadenopathy [Normal Anterior Cervical Nodes] : no anterior cervical lymphadenopathy [No CVA Tenderness] : no CVA  tenderness [No Spinal Tenderness] : no spinal tenderness [No Joint Swelling] : no joint swelling [Grossly Normal Strength/Tone] : grossly normal strength/tone [No Rash] : no rash [Coordination Grossly Intact] : coordination grossly intact [No Focal Deficits] : no focal deficits [Normal Gait] : normal gait [Normal Affect] : the affect was normal [Normal Insight/Judgement] : insight and judgment were intact

## 2022-10-18 NOTE — HEALTH RISK ASSESSMENT
[Good] : ~his/her~  mood as  good [Never] : Never [No] : No [0] : 2) Feeling down, depressed, or hopeless: Not at all (0) [PHQ-2 Negative - No further assessment needed] : PHQ-2 Negative - No further assessment needed [With Family] : lives with family [Employed] : employed [] :  [# Of Children ___] : has [unfilled] children [Sexually Active] : sexually active [Feels Safe at Home] : Feels safe at home [Fully functional (bathing, dressing, toileting, transferring, walking, feeding)] : Fully functional (bathing, dressing, toileting, transferring, walking, feeding) [Fully functional (using the telephone, shopping, preparing meals, housekeeping, doing laundry, using] : Fully functional and needs no help or supervision to perform IADLs (using the telephone, shopping, preparing meals, housekeeping, doing laundry, using transportation, managing medications and managing finances) [None] : None [de-identified] : walking the dog daily  [de-identified] : no meat, whole foods, trying to lose weight [LNU2Zapbv] : 0 [High Risk Behavior] : no high risk behavior [Reports changes in hearing] : Reports no changes in hearing [Reports changes in vision] : Reports no changes in vision [Reports changes in dental health] : Reports no changes in dental health [de-identified] : living with  and 10 yo daughter and dog [employer]

## 2022-10-18 NOTE — ASSESSMENT
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 HLD/hyperTGL, family hx atrial myxoma with normal TTE 9/2022, prediabetes, transaminitis recently 2/2 atorvastatin, hx of low vit D presents for new patient visit. \par \par 1. Transaminitis\par -repeat CMP today - \par \par 2. HLD, Hyper TGL\par -transaminitis when had fever on atorvastatin 80. \par -repeat lipids\par -could try rosuvastatin possibly? she is going to f/u with Dr. Bates. \par \par 3. PreDM\par -working on carb restriction and exercise\par \par 4. Mild asthma/GERD\par -try pepcid\par -pt working on lifestyle changes. \par \par 5. HCM\par -mammogram - going to do repeat imaging\par -pap smear - neg 12/15/2020\par -HIV/HCV screening\par -colonoscopy: has plans to see GI\par -COVID vaccine - completed, going to get 4th booster. \par -flu vaccine completed at work\par -tdap 08/2017\par -shingrix - discussed r/b going to get once transaminitis resolves. \par \par rtc 1 year or sooner if needed (also pending lab results)

## 2022-10-18 NOTE — HISTORY OF PRESENT ILLNESS
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 Bates 22: father had atrial myxoma. pt swims labs 1 hr 3x weekly. she eats healthy meals. She had elevated A1c. BP was elevated. Atorvastatin 80 mg daily started then stopped 2/2 elevated LFT. Was in ER with fever. Normal TTE 22 \par Reviewed note from Dr. Ernst 21 - dx with mild asthma. started advair. \par dili from lipitor\par bp high in office - BP outside office was normal. \par PSH:left shoulder surgery and  section\par FH:myxoma in father. afib in mom and t2dm. crest syndrome. father has aortic aneurysm and hashimoto thyroiditis. tetarology of fallot\par SH: see below\par \par Medications: none now\par Allergies: lidocaine and lipitor. \par

## 2022-10-19 ENCOUNTER — TRANSCRIPTION ENCOUNTER (OUTPATIENT)
Age: 52
End: 2022-10-19

## 2022-10-19 LAB
ALBUMIN SERPL ELPH-MCNC: 4.9 G/DL
ALP BLD-CCNC: 120 U/L
ALT SERPL-CCNC: 22 U/L
ANION GAP SERPL CALC-SCNC: 16 MMOL/L
AST SERPL-CCNC: 19 U/L
BILIRUB SERPL-MCNC: 0.6 MG/DL
BUN SERPL-MCNC: 9 MG/DL
CALCIUM SERPL-MCNC: 10.3 MG/DL
CHLORIDE SERPL-SCNC: 100 MMOL/L
CHOLEST SERPL-MCNC: 300 MG/DL
CO2 SERPL-SCNC: 25 MMOL/L
CREAT SERPL-MCNC: 0.52 MG/DL
EGFR: 110 ML/MIN/1.73M2
GLUCOSE SERPL-MCNC: 100 MG/DL
HDLC SERPL-MCNC: 63 MG/DL
LDLC SERPL CALC-MCNC: 180 MG/DL
NONHDLC SERPL-MCNC: 236 MG/DL
POTASSIUM SERPL-SCNC: 4.2 MMOL/L
PROT SERPL-MCNC: 7.5 G/DL
SODIUM SERPL-SCNC: 141 MMOL/L
TRIGL SERPL-MCNC: 280 MG/DL

## 2022-10-20 ENCOUNTER — APPOINTMENT (OUTPATIENT)
Dept: HEPATOLOGY | Facility: CLINIC | Age: 52
End: 2022-10-20

## 2022-10-20 VITALS
SYSTOLIC BLOOD PRESSURE: 122 MMHG | HEIGHT: 64 IN | BODY MASS INDEX: 25.27 KG/M2 | HEART RATE: 66 BPM | RESPIRATION RATE: 14 BRPM | OXYGEN SATURATION: 98 % | DIASTOLIC BLOOD PRESSURE: 82 MMHG | WEIGHT: 148 LBS | TEMPERATURE: 98 F

## 2022-10-20 PROCEDURE — 91200 LIVER ELASTOGRAPHY: CPT

## 2022-10-20 PROCEDURE — 99204 OFFICE O/P NEW MOD 45 MIN: CPT

## 2022-11-17 ENCOUNTER — APPOINTMENT (OUTPATIENT)
Dept: MRI IMAGING | Facility: IMAGING CENTER | Age: 52
End: 2022-11-17

## 2022-11-17 ENCOUNTER — OUTPATIENT (OUTPATIENT)
Dept: OUTPATIENT SERVICES | Facility: HOSPITAL | Age: 52
LOS: 1 days | End: 2022-11-17
Payer: COMMERCIAL

## 2022-11-17 DIAGNOSIS — Z98.890 OTHER SPECIFIED POSTPROCEDURAL STATES: Chronic | ICD-10-CM

## 2022-11-17 DIAGNOSIS — Z00.8 ENCOUNTER FOR OTHER GENERAL EXAMINATION: ICD-10-CM

## 2022-11-17 PROCEDURE — 73721 MRI JNT OF LWR EXTRE W/O DYE: CPT | Mod: 26,LT

## 2022-11-17 PROCEDURE — 73721 MRI JNT OF LWR EXTRE W/O DYE: CPT

## 2022-11-30 ENCOUNTER — NON-APPOINTMENT (OUTPATIENT)
Age: 52
End: 2022-11-30

## 2022-12-12 ENCOUNTER — OUTPATIENT (OUTPATIENT)
Dept: OUTPATIENT SERVICES | Facility: HOSPITAL | Age: 52
LOS: 1 days | End: 2022-12-12
Payer: COMMERCIAL

## 2022-12-12 VITALS
OXYGEN SATURATION: 98 % | TEMPERATURE: 98 F | HEIGHT: 65 IN | DIASTOLIC BLOOD PRESSURE: 84 MMHG | RESPIRATION RATE: 16 BRPM | SYSTOLIC BLOOD PRESSURE: 125 MMHG | WEIGHT: 147.93 LBS | HEART RATE: 60 BPM

## 2022-12-12 DIAGNOSIS — Z01.818 ENCOUNTER FOR OTHER PREPROCEDURAL EXAMINATION: ICD-10-CM

## 2022-12-12 DIAGNOSIS — S83.272A COMPLEX TEAR OF LATERAL MENISCUS, CURRENT INJURY, LEFT KNEE, INITIAL ENCOUNTER: ICD-10-CM

## 2022-12-12 DIAGNOSIS — Z98.890 OTHER SPECIFIED POSTPROCEDURAL STATES: Chronic | ICD-10-CM

## 2022-12-12 DIAGNOSIS — J45.909 UNSPECIFIED ASTHMA, UNCOMPLICATED: ICD-10-CM

## 2022-12-12 LAB
ANION GAP SERPL CALC-SCNC: 14 MMOL/L — SIGNIFICANT CHANGE UP (ref 5–17)
BUN SERPL-MCNC: 12 MG/DL — SIGNIFICANT CHANGE UP (ref 7–23)
CALCIUM SERPL-MCNC: 9.9 MG/DL — SIGNIFICANT CHANGE UP (ref 8.4–10.5)
CHLORIDE SERPL-SCNC: 99 MMOL/L — SIGNIFICANT CHANGE UP (ref 96–108)
CO2 SERPL-SCNC: 25 MMOL/L — SIGNIFICANT CHANGE UP (ref 22–31)
CREAT SERPL-MCNC: 0.5 MG/DL — SIGNIFICANT CHANGE UP (ref 0.5–1.3)
EGFR: 111 ML/MIN/1.73M2 — SIGNIFICANT CHANGE UP
GLUCOSE SERPL-MCNC: 94 MG/DL — SIGNIFICANT CHANGE UP (ref 70–99)
HCT VFR BLD CALC: 40.6 % — SIGNIFICANT CHANGE UP (ref 34.5–45)
HGB BLD-MCNC: 13.3 G/DL — SIGNIFICANT CHANGE UP (ref 11.5–15.5)
MCHC RBC-ENTMCNC: 29 PG — SIGNIFICANT CHANGE UP (ref 27–34)
MCHC RBC-ENTMCNC: 32.8 GM/DL — SIGNIFICANT CHANGE UP (ref 32–36)
MCV RBC AUTO: 88.6 FL — SIGNIFICANT CHANGE UP (ref 80–100)
NRBC # BLD: 0 /100 WBCS — SIGNIFICANT CHANGE UP (ref 0–0)
PLATELET # BLD AUTO: 330 K/UL — SIGNIFICANT CHANGE UP (ref 150–400)
POTASSIUM SERPL-MCNC: 4 MMOL/L — SIGNIFICANT CHANGE UP (ref 3.5–5.3)
POTASSIUM SERPL-SCNC: 4 MMOL/L — SIGNIFICANT CHANGE UP (ref 3.5–5.3)
RBC # BLD: 4.58 M/UL — SIGNIFICANT CHANGE UP (ref 3.8–5.2)
RBC # FLD: 13.2 % — SIGNIFICANT CHANGE UP (ref 10.3–14.5)
SODIUM SERPL-SCNC: 138 MMOL/L — SIGNIFICANT CHANGE UP (ref 135–145)
WBC # BLD: 7.37 K/UL — SIGNIFICANT CHANGE UP (ref 3.8–10.5)
WBC # FLD AUTO: 7.37 K/UL — SIGNIFICANT CHANGE UP (ref 3.8–10.5)

## 2022-12-12 PROCEDURE — 85027 COMPLETE CBC AUTOMATED: CPT

## 2022-12-12 PROCEDURE — 80048 BASIC METABOLIC PNL TOTAL CA: CPT

## 2022-12-12 PROCEDURE — G0463: CPT

## 2022-12-12 RX ORDER — SODIUM CHLORIDE 9 MG/ML
3 INJECTION INTRAMUSCULAR; INTRAVENOUS; SUBCUTANEOUS EVERY 8 HOURS
Refills: 0 | Status: DISCONTINUED | OUTPATIENT
Start: 2022-12-29 | End: 2023-01-12

## 2022-12-12 RX ORDER — SODIUM CHLORIDE 9 MG/ML
1000 INJECTION, SOLUTION INTRAVENOUS
Refills: 0 | Status: DISCONTINUED | OUTPATIENT
Start: 2022-12-29 | End: 2023-01-12

## 2022-12-12 NOTE — H&P PST ADULT - HISTORY OF PRESENT ILLNESS
51 YO F c/o intermittent left knee pain &edema x 2 months-s/p MRI revealed complex tear lateral meniscus. Pt scheduled for left knee arthroscopy, partial meniscectomy on 12/29/22     ** Denies any trauma, fall/injury,  palpitations, SOB, N/V, fever or chills.     **Has positive Covid PCR on 12/4/22, result enclosed in chart, e-mail sent to Surgeon

## 2022-12-12 NOTE — H&P PST ADULT - MUSCULOSKELETAL
left knee trace edema, limited ROM/ROM intact/no joint swelling/no calf tenderness/decreased ROM due to pain/joint swelling details…

## 2022-12-12 NOTE — H&P PST ADULT - NSHP PST DIAGEKG REVIEWED YN_GEN_A_CORE
Chief Complaint   Patient presents with   • Sore Throat     sore throat, fever, cough, chest and head congestion        SUBJECTIVE:    Patient is 13 years old and has respiratory infection. Onset 5 days ago. Had fever for 4 days, but not today. He has nasal congestion and drainage. No ear pain. Does report a sore throat. Has a dry cough. No chest pain or shortness breath. No nausea, vomiting or diarrhea. No rash. He is accompanied by his mother who wants to rule out strep throat.    Social History     Socioeconomic History   • Marital status: Single     Spouse name: Not on file   • Number of children: Not on file   • Years of education: Not on file   • Highest education level: Not on file   Occupational History   • Not on file   Social Needs   • Financial resource strain: Not on file   • Food insecurity:     Worry: Not on file     Inability: Not on file   • Transportation needs:     Medical: Not on file     Non-medical: Not on file   Tobacco Use   • Smoking status: Never Smoker   • Smokeless tobacco: Never Used   Substance and Sexual Activity   • Alcohol use: Not on file   • Drug use: Not on file   • Sexual activity: Not on file   Lifestyle   • Physical activity:     Days per week: Not on file     Minutes per session: Not on file   • Stress: Not on file   Relationships   • Social connections:     Talks on phone: Not on file     Gets together: Not on file     Attends Anabaptist service: Not on file     Active member of club or organization: Not on file     Attends meetings of clubs or organizations: Not on file     Relationship status: Not on file   • Intimate partner violence:     Fear of current or ex partner: Not on file     Emotionally abused: Not on file     Physically abused: Not on file     Forced sexual activity: Not on file   Other Topics Concern   • Not on file   Social History Narrative   • Not on file         OBJECTIVE:  Visit Vitals  /72 (BP [place] [facility] [place] Position: Sitting, Cuff Size: Large  Adult)   Pulse 89   Temp 97.8 °F (36.6 °C) (Temporal)   Wt (!) 115 kg   SpO2 96%     GEN: Non-ill-appearing.  Skin: No pallor or jaundice.  HEENT: Conjunctiva noninjected. Tympanic membranes are normal. Tonsils and posterior pharynx with erythema and swelling. No exudate.  Neck: Minimal cervical adenopathy.  Lungs: No wheezes or crackles. No retractions or increased work of breathing. Good air movement.  Cor: Regular rhythm no murmur or rub.  Neuro: No meningeal signs.    Rapid strep negative.    (J06.9) Acute URI  (primary encounter diagnosis)  (J03.90) Acute tonsillitis, unspecified etiology  (J20.9) Acute bronchitis, unspecified organism    Plan: Most likely has viral respiratory infection. No antibiotic needed at this time. Symptomatic care. Strep culture sent. School excuse provided. Follow-up of 30 problems or concerns.           No

## 2022-12-12 NOTE — H&P PST ADULT - NSICDXPASTMEDICALHX_GEN_ALL_CORE_FT
PAST MEDICAL HISTORY:  Asthma, mild     COVID-19 virus infection     GERD (gastroesophageal reflux disease)     Hyperlipidemia

## 2022-12-21 ENCOUNTER — NON-APPOINTMENT (OUTPATIENT)
Age: 52
End: 2022-12-21

## 2022-12-21 ENCOUNTER — APPOINTMENT (OUTPATIENT)
Dept: GASTROENTEROLOGY | Facility: CLINIC | Age: 52
End: 2022-12-21

## 2022-12-21 ENCOUNTER — APPOINTMENT (OUTPATIENT)
Dept: CARDIOLOGY | Facility: CLINIC | Age: 52
End: 2022-12-21

## 2022-12-21 VITALS
HEART RATE: 62 BPM | SYSTOLIC BLOOD PRESSURE: 134 MMHG | TEMPERATURE: 98 F | OXYGEN SATURATION: 99 % | WEIGHT: 148 LBS | BODY MASS INDEX: 25.27 KG/M2 | DIASTOLIC BLOOD PRESSURE: 87 MMHG | HEIGHT: 64 IN

## 2022-12-21 DIAGNOSIS — Z01.818 ENCOUNTER FOR OTHER PREPROCEDURAL EXAMINATION: ICD-10-CM

## 2022-12-21 DIAGNOSIS — Z80.0 FAMILY HISTORY OF MALIGNANT NEOPLASM OF DIGESTIVE ORGANS: ICD-10-CM

## 2022-12-21 DIAGNOSIS — Z12.11 ENCOUNTER FOR SCREENING FOR MALIGNANT NEOPLASM OF COLON: ICD-10-CM

## 2022-12-21 PROCEDURE — 93000 ELECTROCARDIOGRAM COMPLETE: CPT

## 2022-12-21 PROCEDURE — 99202 OFFICE O/P NEW SF 15 MIN: CPT | Mod: 95

## 2022-12-21 PROCEDURE — 99214 OFFICE O/P EST MOD 30 MIN: CPT

## 2022-12-21 RX ORDER — SODIUM PICOSULFATE, MAGNESIUM OXIDE, AND ANHYDROUS CITRIC ACID 10; 3.5; 12 MG/160ML; G/160ML; G/160ML
10-3.5-12 MG-GM LIQUID ORAL
Qty: 1 | Refills: 0 | Status: ACTIVE | COMMUNITY
Start: 2022-12-21 | End: 1900-01-01

## 2022-12-21 NOTE — REASON FOR VISIT
[Symptom and Test Evaluation] : symptom and test evaluation [Hyperlipidemia] : hyperlipidemia [FreeTextEntry1] : BP log with excellent BP control at home.\par LFTs improved off statin. Last \par Scheduled for knee surgery 12/29/22 at [facility] outpatient [facility].\par Being careful with diet, primarily plant based/vegetarian\par \par 1. Check labs including lipids and A1C\par 2. Will add in exercise after knee surgery recovery. \par 3. No evidence of HTN or need for therapy. Will periodically monitor.

## 2022-12-21 NOTE — ASSESSMENT
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  I explained to her the risks, alternatives and benefits to a colonoscopy.  Risk including but not limited to bleeding, perforation, infection and adverse medication reaction.  Questions were answered.  She stated understanding.

## 2022-12-21 NOTE — HISTORY OF PRESENT ILLNESS
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  She is a pleasant 52-year-old female with history of hyperlipidemia presenting for colon cancer screening evaluation.  She has never had a lower GI evaluation.  She reports family history of colon cancer in a grandfather.  Her father had colon polyps which were thought to be benign.  She denies abdominal pain, nausea or vomiting.  She denies changes in bowel habits.  She denies rectal bleeding or melena.  She denies weight loss or anemia.

## 2022-12-21 NOTE — ASSESSMENT
[FreeTextEntry1] : 1. Check labs including lipids and A1C\par 2. Will add in exercise after knee surgery recovery. \par 3. No evidence of HTN or need for therapy. Will periodically monitor.

## 2022-12-23 ENCOUNTER — APPOINTMENT (OUTPATIENT)
Dept: CARDIOLOGY | Facility: CLINIC | Age: 52
End: 2022-12-23

## 2022-12-23 PROBLEM — U07.1 COVID-19: Chronic | Status: ACTIVE | Noted: 2022-12-12

## 2022-12-23 PROCEDURE — 36415 COLL VENOUS BLD VENIPUNCTURE: CPT

## 2022-12-27 LAB
ALBUMIN SERPL ELPH-MCNC: 4.8 G/DL
ALP BLD-CCNC: 52 U/L
ALT SERPL-CCNC: 14 U/L
ANION GAP SERPL CALC-SCNC: 13 MMOL/L
AST SERPL-CCNC: 14 U/L
BILIRUB SERPL-MCNC: 0.4 MG/DL
BUN SERPL-MCNC: 16 MG/DL
CALCIUM SERPL-MCNC: 9.8 MG/DL
CHLORIDE SERPL-SCNC: 102 MMOL/L
CHOLEST SERPL-MCNC: 237 MG/DL
CO2 SERPL-SCNC: 24 MMOL/L
CREAT SERPL-MCNC: 0.55 MG/DL
EGFR: 109 ML/MIN/1.73M2
ESTIMATED AVERAGE GLUCOSE: 117 MG/DL
GLUCOSE SERPL-MCNC: 95 MG/DL
HBA1C MFR BLD HPLC: 5.7 %
HDLC SERPL-MCNC: 67 MG/DL
LDLC SERPL CALC-MCNC: 147 MG/DL
NONHDLC SERPL-MCNC: 169 MG/DL
POTASSIUM SERPL-SCNC: 4.7 MMOL/L
PROT SERPL-MCNC: 7.1 G/DL
SODIUM SERPL-SCNC: 140 MMOL/L
TRIGL SERPL-MCNC: 111 MG/DL

## 2022-12-28 ENCOUNTER — TRANSCRIPTION ENCOUNTER (OUTPATIENT)
Age: 52
End: 2022-12-28

## 2022-12-29 ENCOUNTER — TRANSCRIPTION ENCOUNTER (OUTPATIENT)
Age: 52
End: 2022-12-29

## 2022-12-29 ENCOUNTER — OUTPATIENT (OUTPATIENT)
Dept: OUTPATIENT SERVICES | Facility: HOSPITAL | Age: 52
LOS: 1 days | End: 2022-12-29
Payer: COMMERCIAL

## 2022-12-29 VITALS
SYSTOLIC BLOOD PRESSURE: 112 MMHG | HEART RATE: 52 BPM | WEIGHT: 147.93 LBS | OXYGEN SATURATION: 98 % | TEMPERATURE: 97 F | HEIGHT: 65 IN | DIASTOLIC BLOOD PRESSURE: 76 MMHG | RESPIRATION RATE: 16 BRPM

## 2022-12-29 VITALS
TEMPERATURE: 98 F | DIASTOLIC BLOOD PRESSURE: 74 MMHG | RESPIRATION RATE: 12 BRPM | SYSTOLIC BLOOD PRESSURE: 117 MMHG | OXYGEN SATURATION: 100 % | HEART RATE: 69 BPM

## 2022-12-29 DIAGNOSIS — S83.272A COMPLEX TEAR OF LATERAL MENISCUS, CURRENT INJURY, LEFT KNEE, INITIAL ENCOUNTER: ICD-10-CM

## 2022-12-29 DIAGNOSIS — Z98.890 OTHER SPECIFIED POSTPROCEDURAL STATES: Chronic | ICD-10-CM

## 2022-12-29 PROCEDURE — 29881 ARTHRS KNE SRG MNISECTMY M/L: CPT | Mod: LT

## 2022-12-29 RX ORDER — SODIUM CHLORIDE 9 MG/ML
1000 INJECTION, SOLUTION INTRAVENOUS
Refills: 0 | Status: DISCONTINUED | OUTPATIENT
Start: 2022-12-29 | End: 2023-01-12

## 2022-12-29 RX ORDER — HYDROMORPHONE HYDROCHLORIDE 2 MG/ML
0.25 INJECTION INTRAMUSCULAR; INTRAVENOUS; SUBCUTANEOUS
Refills: 0 | Status: DISCONTINUED | OUTPATIENT
Start: 2022-12-29 | End: 2022-12-29

## 2022-12-29 RX ORDER — ONDANSETRON 8 MG/1
4 TABLET, FILM COATED ORAL ONCE
Refills: 0 | Status: COMPLETED | OUTPATIENT
Start: 2022-12-29 | End: 2022-12-29

## 2022-12-29 RX ORDER — CHLORHEXIDINE GLUCONATE 213 G/1000ML
1 SOLUTION TOPICAL ONCE
Refills: 0 | Status: COMPLETED | OUTPATIENT
Start: 2022-12-29 | End: 2022-12-29

## 2022-12-29 RX ORDER — TRAMADOL HYDROCHLORIDE 50 MG/1
1 TABLET ORAL
Qty: 21 | Refills: 0
Start: 2022-12-29 | End: 2023-01-04

## 2022-12-29 RX ORDER — OXYCODONE HYDROCHLORIDE 5 MG/1
5 TABLET ORAL ONCE
Refills: 0 | Status: DISCONTINUED | OUTPATIENT
Start: 2022-12-29 | End: 2022-12-29

## 2022-12-29 RX ADMIN — SODIUM CHLORIDE 100 MILLILITER(S): 9 INJECTION, SOLUTION INTRAVENOUS at 05:44

## 2022-12-29 RX ADMIN — ONDANSETRON 4 MILLIGRAM(S): 8 TABLET, FILM COATED ORAL at 09:00

## 2022-12-29 RX ADMIN — SODIUM CHLORIDE 3 MILLILITER(S): 9 INJECTION INTRAMUSCULAR; INTRAVENOUS; SUBCUTANEOUS at 05:45

## 2022-12-29 RX ADMIN — OXYCODONE HYDROCHLORIDE 5 MILLIGRAM(S): 5 TABLET ORAL at 08:59

## 2022-12-29 RX ADMIN — CHLORHEXIDINE GLUCONATE 1 APPLICATION(S): 213 SOLUTION TOPICAL at 05:45

## 2022-12-29 RX ADMIN — OXYCODONE HYDROCHLORIDE 5 MILLIGRAM(S): 5 TABLET ORAL at 09:25

## 2022-12-29 NOTE — ASU PATIENT PROFILE, ADULT - FALL HARM RISK - UNIVERSAL INTERVENTIONS
Bed in lowest position, wheels locked, appropriate side rails in place/Call bell, personal items and telephone in reach/Instruct patient to call for assistance before getting out of bed or chair/Non-slip footwear when patient is out of bed/[place] to call system/Physically safe environment - no spills, clutter or unnecessary equipment/Purposeful Proactive Rounding/Room/bathroom lighting operational, light cord in reach

## 2022-12-29 NOTE — ASU PREOP CHECKLIST - BP NONINVASIVE SYSTOLIC (MM HG)
112
I have personally performed a face to face diagnostic evaluation on this patient. I have reviewed the ACP note and agree with the history, exam and plan of care, except as noted.

## 2022-12-29 NOTE — ASU DISCHARGE PLAN (ADULT/PEDIATRIC) - CARE PROVIDER_API CALL
Pee Ruff)  Orthopaedic Surgery  [address] [place] [address], [address]  [place] [address]  Phone: [phone]  Fax: [phone]  Follow Up Time: 2 weeks

## 2022-12-29 NOTE — ASU DISCHARGE PLAN (ADULT/PEDIATRIC) - NS MD DC FALL RISK RISK
For information on Fall & Injury Prevention, visit: [email or web address] OR  [email or web address] OR  https://www.cdc.gov/steadi/patient.html

## 2022-12-29 NOTE — ASU DISCHARGE PLAN (ADULT/PEDIATRIC) - ASU DC SPECIAL INSTRUCTIONSFT
Please keep dressing clean and dry    Please follow up with Dr. Whitney in 2 weeks, call to make an appointment    Take prescriptions as prescribed

## 2022-12-29 NOTE — ASU DISCHARGE PLAN (ADULT/PEDIATRIC) - NURSING INSTRUCTIONS
PRINCIPAL PROCEDURE  Procedure: Resection, sigmoid colon, open  Findings and Treatment:       SECONDARY PROCEDURE  Procedure: Small bowel resection with anastomosis  Findings and Treatment:     Procedure: Creation, colostomy  Findings and Treatment:     Procedure: Repair of incarcerated recurrent ventral hernia  Findings and Treatment:     Procedure: Incision and drainage of intra-abdominal abscess  Findings and Treatment:
You were given Tylenol during your visit, the next dose of Tylenol will be on or after _____1:30 PM______ ,today/tonight and every 6 hours afterwards for pain management, do not take any Tylenol containing products until this time. Do not exceed more than 4000mg of Tylenol in one 24 hour setting. If no contraindications, you may alternate with Ibuprofen or Naproxen 3 hours after dose of Tylenol. Ibuprofen can be taken every 6 hours. Naproxen may be taken every 12 hours.

## 2023-01-24 ENCOUNTER — APPOINTMENT (OUTPATIENT)
Dept: GASTROENTEROLOGY | Facility: AMBULATORY MEDICAL SERVICES | Age: 53
End: 2023-01-24
Payer: COMMERCIAL

## 2023-01-24 PROCEDURE — 45385 COLONOSCOPY W/LESION REMOVAL: CPT | Mod: 33

## 2023-05-23 ENCOUNTER — APPOINTMENT (OUTPATIENT)
Dept: OBGYN | Facility: CLINIC | Age: 53
End: 2023-05-23
Payer: COMMERCIAL

## 2023-05-23 VITALS
SYSTOLIC BLOOD PRESSURE: 133 MMHG | WEIGHT: 150 LBS | HEIGHT: 64 IN | BODY MASS INDEX: 25.61 KG/M2 | DIASTOLIC BLOOD PRESSURE: 63 MMHG

## 2023-05-23 DIAGNOSIS — Z01.411 ENCOUNTER FOR GYNECOLOGICAL EXAMINATION (GENERAL) (ROUTINE) WITH ABNORMAL FINDINGS: ICD-10-CM

## 2023-05-23 DIAGNOSIS — N95.1 MENOPAUSAL AND FEMALE CLIMACTERIC STATES: ICD-10-CM

## 2023-05-23 PROCEDURE — 99214 OFFICE O/P EST MOD 30 MIN: CPT | Mod: 25

## 2023-05-23 PROCEDURE — 99396 PREV VISIT EST AGE 40-64: CPT

## 2023-05-23 PROCEDURE — 77080 DXA BONE DENSITY AXIAL: CPT

## 2023-05-23 PROCEDURE — 82270 OCCULT BLOOD FECES: CPT

## 2023-05-23 RX ORDER — ESTRADIOL 10 UG/1
10 INSERT VAGINAL
Qty: 18 | Refills: 3 | Status: ACTIVE | COMMUNITY
Start: 2023-05-23 | End: 1900-01-01

## 2023-05-24 LAB — HPV HIGH+LOW RISK DNA PNL CVX: NOT DETECTED

## 2023-05-29 LAB — CYTOLOGY CVX/VAG DOC THIN PREP: ABNORMAL

## 2023-06-16 ENCOUNTER — APPOINTMENT (OUTPATIENT)
Dept: OBGYN | Facility: CLINIC | Age: 53
End: 2023-06-16
Payer: COMMERCIAL

## 2023-06-16 VITALS — SYSTOLIC BLOOD PRESSURE: 144 MMHG | DIASTOLIC BLOOD PRESSURE: 76 MMHG

## 2023-06-16 PROCEDURE — 56605 BIOPSY OF VULVA/PERINEUM: CPT

## 2023-06-16 PROCEDURE — 99214 OFFICE O/P EST MOD 30 MIN: CPT | Mod: 25

## 2023-06-16 RX ORDER — ESTRADIOL 10 UG/1
10 TABLET, FILM COATED VAGINAL
Qty: 4 | Refills: 3 | Status: ACTIVE | COMMUNITY
Start: 2023-06-16 | End: 1900-01-01

## 2023-06-29 LAB — CORE LAB BIOPSY: NORMAL

## 2023-07-05 RX ORDER — CLOBETASOL PROPIONATE 0.5 MG/G
0.05 OINTMENT TOPICAL
Qty: 1 | Refills: 1 | Status: ACTIVE | COMMUNITY
Start: 2023-05-23 | End: 1900-01-01

## 2023-07-27 ENCOUNTER — APPOINTMENT (OUTPATIENT)
Dept: MAMMOGRAPHY | Facility: IMAGING CENTER | Age: 53
End: 2023-07-27
Payer: COMMERCIAL

## 2023-07-27 ENCOUNTER — APPOINTMENT (OUTPATIENT)
Dept: ULTRASOUND IMAGING | Facility: IMAGING CENTER | Age: 53
End: 2023-07-27
Payer: COMMERCIAL

## 2023-07-27 ENCOUNTER — RESULT REVIEW (OUTPATIENT)
Age: 53
End: 2023-07-27

## 2023-07-27 ENCOUNTER — OUTPATIENT (OUTPATIENT)
Dept: OUTPATIENT SERVICES | Facility: HOSPITAL | Age: 53
LOS: 1 days | End: 2023-07-27
Payer: COMMERCIAL

## 2023-07-27 DIAGNOSIS — Z98.890 OTHER SPECIFIED POSTPROCEDURAL STATES: Chronic | ICD-10-CM

## 2023-07-27 DIAGNOSIS — Z00.8 ENCOUNTER FOR OTHER GENERAL EXAMINATION: ICD-10-CM

## 2023-07-27 PROCEDURE — 76641 ULTRASOUND BREAST COMPLETE: CPT | Mod: 26,50

## 2023-07-27 PROCEDURE — 77063 BREAST TOMOSYNTHESIS BI: CPT

## 2023-07-27 PROCEDURE — 77067 SCR MAMMO BI INCL CAD: CPT | Mod: 26

## 2023-07-27 PROCEDURE — 77067 SCR MAMMO BI INCL CAD: CPT

## 2023-07-27 PROCEDURE — 77063 BREAST TOMOSYNTHESIS BI: CPT | Mod: 26

## 2023-07-27 PROCEDURE — 76641 ULTRASOUND BREAST COMPLETE: CPT

## 2023-09-12 ENCOUNTER — APPOINTMENT (OUTPATIENT)
Dept: CARDIOLOGY | Facility: CLINIC | Age: 53
End: 2023-09-12
Payer: COMMERCIAL

## 2023-09-12 ENCOUNTER — NON-APPOINTMENT (OUTPATIENT)
Age: 53
End: 2023-09-12

## 2023-09-12 VITALS
OXYGEN SATURATION: 99 % | HEIGHT: 64 IN | HEART RATE: 66 BPM | DIASTOLIC BLOOD PRESSURE: 81 MMHG | WEIGHT: 150 LBS | SYSTOLIC BLOOD PRESSURE: 147 MMHG | BODY MASS INDEX: 25.61 KG/M2

## 2023-09-12 VITALS — SYSTOLIC BLOOD PRESSURE: 128 MMHG | DIASTOLIC BLOOD PRESSURE: 80 MMHG

## 2023-09-12 PROCEDURE — 99214 OFFICE O/P EST MOD 30 MIN: CPT

## 2023-09-12 PROCEDURE — 36415 COLL VENOUS BLD VENIPUNCTURE: CPT

## 2023-09-12 PROCEDURE — 93000 ELECTROCARDIOGRAM COMPLETE: CPT

## 2023-09-13 LAB
ALBUMIN SERPL ELPH-MCNC: 4.8 G/DL
ALP BLD-CCNC: 52 U/L
ALT SERPL-CCNC: 11 U/L
ANION GAP SERPL CALC-SCNC: 15 MMOL/L
AST SERPL-CCNC: 16 U/L
BILIRUB SERPL-MCNC: 0.3 MG/DL
BUN SERPL-MCNC: 13 MG/DL
CALCIUM SERPL-MCNC: 9.6 MG/DL
CHLORIDE SERPL-SCNC: 100 MMOL/L
CHOLEST SERPL-MCNC: 269 MG/DL
CO2 SERPL-SCNC: 23 MMOL/L
CREAT SERPL-MCNC: 0.48 MG/DL
CRP SERPL HS-MCNC: 1.64 MG/L
EGFR: 112 ML/MIN/1.73M2
ESTIMATED AVERAGE GLUCOSE: 123 MG/DL
GLUCOSE SERPL-MCNC: 102 MG/DL
HBA1C MFR BLD HPLC: 5.9 %
HDLC SERPL-MCNC: 69 MG/DL
LDLC SERPL CALC-MCNC: 174 MG/DL
NONHDLC SERPL-MCNC: 200 MG/DL
POTASSIUM SERPL-SCNC: 4.4 MMOL/L
PROT SERPL-MCNC: 7.4 G/DL
SODIUM SERPL-SCNC: 138 MMOL/L
TRIGL SERPL-MCNC: 146 MG/DL

## 2023-09-17 ENCOUNTER — RX RENEWAL (OUTPATIENT)
Age: 53
End: 2023-09-17

## 2023-09-17 RX ORDER — ESTRADIOL 0.1 MG/G
0.1 CREAM VAGINAL
Qty: 42.5 | Refills: 1 | Status: ACTIVE | COMMUNITY
Start: 2023-05-23 | End: 1900-01-01

## 2023-09-27 ENCOUNTER — APPOINTMENT (OUTPATIENT)
Dept: OBGYN | Facility: CLINIC | Age: 53
End: 2023-09-27
Payer: COMMERCIAL

## 2023-09-27 VITALS — DIASTOLIC BLOOD PRESSURE: 80 MMHG | SYSTOLIC BLOOD PRESSURE: 138 MMHG

## 2023-09-27 DIAGNOSIS — N90.4 LEUKOPLAKIA OF VULVA: ICD-10-CM

## 2023-09-27 PROCEDURE — 99214 OFFICE O/P EST MOD 30 MIN: CPT

## 2023-10-25 ENCOUNTER — APPOINTMENT (OUTPATIENT)
Dept: INTERNAL MEDICINE | Facility: CLINIC | Age: 53
End: 2023-10-25

## 2023-10-25 ENCOUNTER — APPOINTMENT (OUTPATIENT)
Age: 53
End: 2023-10-25
Payer: COMMERCIAL

## 2023-10-25 ENCOUNTER — MED ADMIN CHARGE (OUTPATIENT)
Age: 53
End: 2023-10-25

## 2023-10-25 ENCOUNTER — OUTPATIENT (OUTPATIENT)
Dept: OUTPATIENT SERVICES | Facility: HOSPITAL | Age: 53
LOS: 1 days | End: 2023-10-25
Payer: COMMERCIAL

## 2023-10-25 ENCOUNTER — APPOINTMENT (OUTPATIENT)
Dept: HEPATOLOGY | Facility: CLINIC | Age: 53
End: 2023-10-25
Payer: COMMERCIAL

## 2023-10-25 VITALS
DIASTOLIC BLOOD PRESSURE: 80 MMHG | WEIGHT: 157 LBS | SYSTOLIC BLOOD PRESSURE: 130 MMHG | BODY MASS INDEX: 26.8 KG/M2 | HEIGHT: 64 IN | OXYGEN SATURATION: 98 % | HEART RATE: 62 BPM

## 2023-10-25 VITALS
BODY MASS INDEX: 26.8 KG/M2 | OXYGEN SATURATION: 97 % | RESPIRATION RATE: 14 BRPM | WEIGHT: 157 LBS | SYSTOLIC BLOOD PRESSURE: 150 MMHG | HEART RATE: 60 BPM | TEMPERATURE: 97.9 F | DIASTOLIC BLOOD PRESSURE: 88 MMHG | HEIGHT: 64 IN

## 2023-10-25 DIAGNOSIS — R73.09 OTHER ABNORMAL GLUCOSE: ICD-10-CM

## 2023-10-25 DIAGNOSIS — R92.30 INCONCLUSIVE MAMMOGRAM: ICD-10-CM

## 2023-10-25 DIAGNOSIS — Z98.890 OTHER SPECIFIED POSTPROCEDURAL STATES: Chronic | ICD-10-CM

## 2023-10-25 DIAGNOSIS — I10 ESSENTIAL (PRIMARY) HYPERTENSION: ICD-10-CM

## 2023-10-25 DIAGNOSIS — Z00.00 ENCOUNTER FOR GENERAL ADULT MEDICAL EXAMINATION W/OUT ABNORMAL FINDINGS: ICD-10-CM

## 2023-10-25 DIAGNOSIS — R73.03 PREDIABETES.: ICD-10-CM

## 2023-10-25 DIAGNOSIS — N90.5 ATROPHY OF VULVA: ICD-10-CM

## 2023-10-25 DIAGNOSIS — M85.80 OTHER SPECIFIED DISORDERS OF BONE DENSITY AND STRUCTURE, UNSPECIFIED SITE: ICD-10-CM

## 2023-10-25 DIAGNOSIS — K76.0 FATTY (CHANGE OF) LIVER, NOT ELSEWHERE CLASSIFIED: ICD-10-CM

## 2023-10-25 DIAGNOSIS — Z23 ENCOUNTER FOR IMMUNIZATION: ICD-10-CM

## 2023-10-25 DIAGNOSIS — R92.2 INCONCLUSIVE MAMMOGRAM: ICD-10-CM

## 2023-10-25 PROCEDURE — 99214 OFFICE O/P EST MOD 30 MIN: CPT

## 2023-10-25 PROCEDURE — 99396 PREV VISIT EST AGE 40-64: CPT

## 2023-10-25 PROCEDURE — G0463: CPT

## 2023-11-02 DIAGNOSIS — R92.2 INCONCLUSIVE MAMMOGRAM: ICD-10-CM

## 2023-11-02 DIAGNOSIS — N90.5 ATROPHY OF VULVA: ICD-10-CM

## 2023-11-02 DIAGNOSIS — E78.00 PURE HYPERCHOLESTEROLEMIA, UNSPECIFIED: ICD-10-CM

## 2023-11-02 DIAGNOSIS — K76.0 FATTY (CHANGE OF) LIVER, NOT ELSEWHERE CLASSIFIED: ICD-10-CM

## 2023-11-02 DIAGNOSIS — Z00.00 ENCOUNTER FOR GENERAL ADULT MEDICAL EXAMINATION WITHOUT ABNORMAL FINDINGS: ICD-10-CM

## 2023-11-02 DIAGNOSIS — M85.80 OTHER SPECIFIED DISORDERS OF BONE DENSITY AND STRUCTURE, UNSPECIFIED SITE: ICD-10-CM

## 2023-11-02 DIAGNOSIS — R73.03 PREDIABETES: ICD-10-CM

## 2023-11-02 DIAGNOSIS — R92.30 DENSE BREASTS, UNSPECIFIED: ICD-10-CM

## 2023-11-22 ENCOUNTER — NON-APPOINTMENT (OUTPATIENT)
Age: 53
End: 2023-11-22

## 2023-11-22 ENCOUNTER — APPOINTMENT (OUTPATIENT)
Dept: ULTRASOUND IMAGING | Facility: CLINIC | Age: 53
End: 2023-11-22
Payer: COMMERCIAL

## 2023-11-22 ENCOUNTER — OUTPATIENT (OUTPATIENT)
Dept: OUTPATIENT SERVICES | Facility: HOSPITAL | Age: 53
LOS: 1 days | End: 2023-11-22
Payer: COMMERCIAL

## 2023-11-22 DIAGNOSIS — K76.0 FATTY (CHANGE OF) LIVER, NOT ELSEWHERE CLASSIFIED: ICD-10-CM

## 2023-11-22 DIAGNOSIS — Z98.890 OTHER SPECIFIED POSTPROCEDURAL STATES: Chronic | ICD-10-CM

## 2023-11-22 PROCEDURE — 76700 US EXAM ABDOM COMPLETE: CPT | Mod: 26

## 2023-11-22 PROCEDURE — 76700 US EXAM ABDOM COMPLETE: CPT

## 2023-12-01 ENCOUNTER — APPOINTMENT (OUTPATIENT)
Dept: HEPATOLOGY | Facility: CLINIC | Age: 53
End: 2023-12-01
Payer: COMMERCIAL

## 2023-12-01 DIAGNOSIS — K76.0 FATTY (CHANGE OF) LIVER, NOT ELSEWHERE CLASSIFIED: ICD-10-CM

## 2023-12-01 PROCEDURE — 76981 USE PARENCHYMA: CPT

## 2023-12-04 PROBLEM — K76.0 FATTY LIVER DISEASE, NONALCOHOLIC: Status: ACTIVE | Noted: 2022-10-20

## 2024-01-04 ENCOUNTER — NON-APPOINTMENT (OUTPATIENT)
Age: 54
End: 2024-01-04

## 2024-01-04 ENCOUNTER — APPOINTMENT (OUTPATIENT)
Dept: AFTER HOURS CARE | Facility: EMERGENCY ROOM | Age: 54
End: 2024-01-04
Payer: COMMERCIAL

## 2024-01-04 DIAGNOSIS — U07.1 COVID-19: ICD-10-CM

## 2024-01-04 PROCEDURE — 99442: CPT

## 2024-01-04 RX ORDER — BENZONATATE 200 MG/1
200 CAPSULE ORAL 3 TIMES DAILY
Qty: 21 | Refills: 0 | Status: ACTIVE | COMMUNITY
Start: 2024-01-04 | End: 1900-01-01

## 2024-01-04 RX ORDER — ONDANSETRON 4 MG/1
4 TABLET, ORALLY DISINTEGRATING ORAL EVERY 8 HOURS
Qty: 21 | Refills: 0 | Status: ACTIVE | COMMUNITY
Start: 2024-01-04 | End: 1900-01-01

## 2024-01-04 RX ORDER — NIRMATRELVIR AND RITONAVIR 300-100 MG
20 X 150 MG & KIT ORAL
Qty: 1 | Refills: 0 | Status: ACTIVE | COMMUNITY
Start: 2024-01-04 | End: 1900-01-01

## 2024-01-04 NOTE — HISTORY OF PRESENT ILLNESS
[Other Location: e.g. School (Enter Location, City,State)___] : at [unfilled], at the time of the visit. [Other Location: e.g. Home (Enter Location, City,State)___] : at [unfilled] [Verbal consent obtained from patient] : the patient, [unfilled] [FreeTextEntry8] : 54 y/o F hx HLD c/o sore throat, dry cough, body aches and chills, onset last night. Took a home covid test which was positive today. Vaccinated and boosted for covid. Denies sob, cp, fever, vomiting.

## 2024-01-04 NOTE — PLAN
[TextEntry] : 54 y/o F hx HLD, covid positive, would like rx for Paxlovid. 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 Will rx tessalon and zofran as needed for supportive care.   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

## 2024-01-04 NOTE — PHYSICAL EXAM
[TextEntry] : Unable to do full assessment given limitations in telephonic visit. Pt able to speak clearly in full sentences, no signs of respiratory distress.

## 2024-01-24 NOTE — ASU PREOP CHECKLIST - BP NONINVASIVE DIASTOLIC (MM HG)
Please call her to let her know I put in a new order into the [facility] system to go and repeat her labs today or tomorrow or Friday so I'll have them by her visit on Monday.  Thanks!   74

## 2024-03-12 ENCOUNTER — NON-APPOINTMENT (OUTPATIENT)
Age: 54
End: 2024-03-12

## 2024-03-12 ENCOUNTER — APPOINTMENT (OUTPATIENT)
Dept: CARDIOLOGY | Facility: CLINIC | Age: 54
End: 2024-03-12
Payer: COMMERCIAL

## 2024-03-12 VITALS
HEIGHT: 64 IN | SYSTOLIC BLOOD PRESSURE: 128 MMHG | BODY MASS INDEX: 26.8 KG/M2 | OXYGEN SATURATION: 100 % | HEART RATE: 58 BPM | DIASTOLIC BLOOD PRESSURE: 78 MMHG | WEIGHT: 157 LBS

## 2024-03-12 DIAGNOSIS — E78.00 PURE HYPERCHOLESTEROLEMIA, UNSPECIFIED: ICD-10-CM

## 2024-03-12 PROCEDURE — 99214 OFFICE O/P EST MOD 30 MIN: CPT

## 2024-03-12 PROCEDURE — 93000 ELECTROCARDIOGRAM COMPLETE: CPT

## 2024-03-12 PROCEDURE — G2211 COMPLEX E/M VISIT ADD ON: CPT

## 2024-03-12 NOTE — ASSESSMENT
[FreeTextEntry1] : 1. HLD. Last LDL still out of range. Recheck labs at [facility]. If elevated LDL, consider PCSK9i 2. Elevated Hgb A1C. We reviewed carbohydrate restriction in detail.   I spent 35 minutes on Dr. Valerio's care, including face to face time, review of previous records, and documentation.

## 2024-03-12 NOTE — REASON FOR VISIT
[Symptom and Test Evaluation] : symptom and test evaluation [Hyperlipidemia] : hyperlipidemia [FreeTextEntry1] : Generally doing well, no CP or dyspnea. Exercising in excess of AHA guidelines. Diet has been very consistent.  1. HLD. Last LDL still out of range. Recheck labs at [facility]. If elevated LDL, consider PCSK9i 2. Elevated Hgb A1C. We reviewed carbohydrate restriction in detail.   I spent 35 minutes on Dr. Valerio's care, including face to face time, review of previous records, and documentation.

## 2024-03-12 NOTE — PHYSICAL EXAM
[Well Nourished] : well nourished [Well Developed] : well developed [No Acute Distress] : no acute distress [Normal Conjunctiva] : normal conjunctiva [Normal Venous Pressure] : normal venous pressure [No Carotid Bruit] : no carotid bruit [Normal S1, S2] : normal S1, S2 [No Murmur] : no murmur [No Gallop] : no gallop [No Rub] : no rub [Clear Lung Fields] : clear lung fields [Good Air Entry] : good air entry [No Respiratory Distress] : no respiratory distress  [Soft] : abdomen soft [Non Tender] : non-tender [No Masses/organomegaly] : no masses/organomegaly [Normal Bowel Sounds] : normal bowel sounds [Normal Gait] : normal gait [No Cyanosis] : no cyanosis [No Edema] : no edema [No Clubbing] : no clubbing [No Varicosities] : no varicosities [No Rash] : no rash [No Skin Lesions] : no skin lesions [Moves all extremities] : moves all extremities [No Focal Deficits] : no focal deficits [Normal Speech] : normal speech [Alert and Oriented] : alert and oriented [Normal memory] : normal memory

## 2024-04-25 ENCOUNTER — APPOINTMENT (OUTPATIENT)
Dept: INTERNAL MEDICINE | Facility: CLINIC | Age: 54
End: 2024-04-25

## 2024-07-03 ENCOUNTER — OUTPATIENT (OUTPATIENT)
Dept: OUTPATIENT SERVICES | Facility: HOSPITAL | Age: 54
LOS: 1 days | End: 2024-07-03
Payer: COMMERCIAL

## 2024-07-03 ENCOUNTER — APPOINTMENT (OUTPATIENT)
Dept: INTERNAL MEDICINE | Facility: CLINIC | Age: 54
End: 2024-07-03
Payer: COMMERCIAL

## 2024-07-03 VITALS
OXYGEN SATURATION: 97 % | BODY MASS INDEX: 26.8 KG/M2 | DIASTOLIC BLOOD PRESSURE: 88 MMHG | WEIGHT: 157 LBS | SYSTOLIC BLOOD PRESSURE: 122 MMHG | HEART RATE: 72 BPM | HEIGHT: 64 IN

## 2024-07-03 DIAGNOSIS — E78.00 PURE HYPERCHOLESTEROLEMIA, UNSPECIFIED: ICD-10-CM

## 2024-07-03 DIAGNOSIS — Z98.890 OTHER SPECIFIED POSTPROCEDURAL STATES: Chronic | ICD-10-CM

## 2024-07-03 DIAGNOSIS — K76.0 FATTY (CHANGE OF) LIVER, NOT ELSEWHERE CLASSIFIED: ICD-10-CM

## 2024-07-03 DIAGNOSIS — K71.9 TOXIC LIVER DISEASE, UNSPECIFIED: ICD-10-CM

## 2024-07-03 DIAGNOSIS — I10 ESSENTIAL (PRIMARY) HYPERTENSION: ICD-10-CM

## 2024-07-03 DIAGNOSIS — M85.80 OTHER SPECIFIED DISORDERS OF BONE DENSITY AND STRUCTURE, UNSPECIFIED SITE: ICD-10-CM

## 2024-07-03 DIAGNOSIS — Z00.00 ENCOUNTER FOR GENERAL ADULT MEDICAL EXAMINATION W/OUT ABNORMAL FINDINGS: ICD-10-CM

## 2024-07-03 DIAGNOSIS — Z13.31 ENCOUNTER FOR SCREENING FOR DEPRESSION: ICD-10-CM

## 2024-07-03 DIAGNOSIS — Z13.39 ENCOUNTER FOR SCREENING EXAM FOR OTHER MENTAL HEALTH AND BEHAVIORAL DISORDERS: ICD-10-CM

## 2024-07-03 DIAGNOSIS — R73.09 OTHER ABNORMAL GLUCOSE: ICD-10-CM

## 2024-07-03 PROCEDURE — G0444 DEPRESSION SCREEN ANNUAL: CPT | Mod: 59

## 2024-07-03 PROCEDURE — G0442 ANNUAL ALCOHOL SCREEN 15 MIN: CPT

## 2024-07-03 PROCEDURE — G0463: CPT

## 2024-07-03 PROCEDURE — 99396 PREV VISIT EST AGE 40-64: CPT

## 2024-07-07 PROBLEM — Z13.31 SCREENING FOR DEPRESSION: Status: ACTIVE | Noted: 2024-07-07

## 2024-07-07 PROBLEM — Z13.39 SCREENING FOR ALCOHOLISM: Status: ACTIVE | Noted: 2024-07-07

## 2024-07-15 DIAGNOSIS — E78.00 PURE HYPERCHOLESTEROLEMIA, UNSPECIFIED: ICD-10-CM

## 2024-07-15 DIAGNOSIS — K71.9 TOXIC LIVER DISEASE, UNSPECIFIED: ICD-10-CM

## 2024-07-15 DIAGNOSIS — Z00.00 ENCOUNTER FOR GENERAL ADULT MEDICAL EXAMINATION WITHOUT ABNORMAL FINDINGS: ICD-10-CM

## 2024-07-15 DIAGNOSIS — K76.0 FATTY (CHANGE OF) LIVER, NOT ELSEWHERE CLASSIFIED: ICD-10-CM

## 2024-07-15 DIAGNOSIS — Z13.31 ENCOUNTER FOR SCREENING FOR DEPRESSION: ICD-10-CM

## 2024-07-15 DIAGNOSIS — Z13.39 ENCOUNTER FOR SCREENING EXAMINATION FOR OTHER MENTAL HEALTH AND BEHAVIORAL DISORDERS: ICD-10-CM

## 2024-07-15 DIAGNOSIS — R73.09 OTHER ABNORMAL GLUCOSE: ICD-10-CM

## 2024-07-15 DIAGNOSIS — M85.80 OTHER SPECIFIED DISORDERS OF BONE DENSITY AND STRUCTURE, UNSPECIFIED SITE: ICD-10-CM

## 2024-09-11 ENCOUNTER — APPOINTMENT (OUTPATIENT)
Dept: CARDIOLOGY | Facility: CLINIC | Age: 54
End: 2024-09-11

## 2024-09-13 ENCOUNTER — APPOINTMENT (OUTPATIENT)
Dept: OBGYN | Facility: CLINIC | Age: 54
End: 2024-09-13
Payer: COMMERCIAL

## 2024-09-13 VITALS
DIASTOLIC BLOOD PRESSURE: 83 MMHG | BODY MASS INDEX: 25.61 KG/M2 | WEIGHT: 150 LBS | HEIGHT: 64 IN | SYSTOLIC BLOOD PRESSURE: 134 MMHG

## 2024-09-13 DIAGNOSIS — R92.30 DENSE BREASTS, UNSPECIFIED: ICD-10-CM

## 2024-09-13 DIAGNOSIS — M85.80 OTHER SPECIFIED DISORDERS OF BONE DENSITY AND STRUCTURE, UNSPECIFIED SITE: ICD-10-CM

## 2024-09-13 DIAGNOSIS — Z01.411 ENCOUNTER FOR GYNECOLOGICAL EXAMINATION (GENERAL) (ROUTINE) WITH ABNORMAL FINDINGS: ICD-10-CM

## 2024-09-13 DIAGNOSIS — N90.4 LEUKOPLAKIA OF VULVA: ICD-10-CM

## 2024-09-13 DIAGNOSIS — N95.1 MENOPAUSAL AND FEMALE CLIMACTERIC STATES: ICD-10-CM

## 2024-09-13 DIAGNOSIS — N90.5 ATROPHY OF VULVA: ICD-10-CM

## 2024-09-13 PROCEDURE — 82270 OCCULT BLOOD FECES: CPT

## 2024-09-13 PROCEDURE — 99213 OFFICE O/P EST LOW 20 MIN: CPT | Mod: 25

## 2024-09-13 PROCEDURE — 99459 PELVIC EXAMINATION: CPT

## 2024-09-13 PROCEDURE — 99396 PREV VISIT EST AGE 40-64: CPT

## 2024-09-13 NOTE — HISTORY OF PRESENT ILLNESS
[Yes] : Patient has concerns regarding sex [TextBox_4] : S/p 6/2023 vulvar bx showing LS.  [Mammogramdate] : 7/2023 [TextBox_19] : Normal. Repeat due now, Rx given.  [BreastSonogramDate] : 7/2023 [TextBox_25] : Normal. Repeat due now, Rx given.  [BoneDensityDate] : 5/2023 [TextBox_37] : Osteopenia, nml frax. Repeat due 5/2025 [ColonoscopyDate] : 1/2023 [FreeTextEntry1] : dyspareunia

## 2024-09-13 NOTE — PHYSICAL EXAM
[Chaperone Present] : A chaperone was present in the examining room during all aspects of the physical examination [[id number]] : A chaperone was present during the pelvic exam. [Appropriately responsive] : appropriately responsive [Alert] : alert [No Acute Distress] : no acute distress [No Lymphadenopathy] : no lymphadenopathy [Regular Rate Rhythm] : regular rate rhythm [No Murmurs] : no murmurs [Clear to Auscultation B/L] : clear to auscultation bilaterally [Soft] : soft [Non-tender] : non-tender [Non-distended] : non-distended [No HSM] : No HSM [No Lesions] : no lesions [No Mass] : no mass [Oriented x3] : oriented x3 [Examination Of The Breasts] : a normal appearance [No Masses] : no breast masses were palpable [Labia Majora] : normal [Labia Minora] : normal [Normal] : normal [Uterine Adnexae] : normal [Vulvar Atrophy] : vulvar atrophy [FreeTextEntry1] : no active lichen sclerosus, no suspicious areas or lesions. 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

## 2024-09-13 NOTE — PLAN
[FreeTextEntry1] : 54 year old female presents for routine gyn exam, dysparuenia - PMH osteopenia, atrophy, LS BSE taught Breast and pelvic exam performed Pap/HPV conducted 7/2023 mammogram and breast sonogram. Rx given, due 7/2024 1/2023 colonoscopy, due 1/2028  Osteopenia: 5/2023 DEXA bone density. Rx given, due 5/2025 D/w pt increased calcium in diet & Vit D 1000 U intake, & weight-bearing exercise (i.e. walking) for 30 min daily  Borderline BP:  F/u w/ PCP  LS check: No active LS seen on examination Rx renewal given for clobetasol C/w Balmex/aquaphor, twice daily Advised pt to use Clobetasol PRN  - BID for 1-2wk for flare-ups  Dysparuenia: Advised pt to use hyalogyn and other OTC lubricants (Ky jelly, Astroglide, coconut oil). d/w pt all R/B/A  Vasomotor sxs: D/w pt lifestyle changes   RTO in 3 months for ls check       DUNCAN, Jose Ramon Macedo am scribing for and in the presence of Dr. Thomas in the following sections: HISTORY OF PRESENT ILLNESS, PAST MEDICAL/FAMILY/SOCIAL HISTORY, REVIEW OF SYSTEMS, PHYSICAL EXAM, ASSESSMENT/PLAN.

## 2024-09-17 LAB — HPV HIGH+LOW RISK DNA PNL CVX: NOT DETECTED

## 2024-09-18 LAB — CYTOLOGY CVX/VAG DOC THIN PREP: ABNORMAL

## 2024-10-26 ENCOUNTER — APPOINTMENT (OUTPATIENT)
Dept: ULTRASOUND IMAGING | Facility: IMAGING CENTER | Age: 54
End: 2024-10-26
Payer: COMMERCIAL

## 2024-10-26 ENCOUNTER — RESULT REVIEW (OUTPATIENT)
Age: 54
End: 2024-10-26

## 2024-10-26 ENCOUNTER — APPOINTMENT (OUTPATIENT)
Dept: MAMMOGRAPHY | Facility: IMAGING CENTER | Age: 54
End: 2024-10-26
Payer: COMMERCIAL

## 2024-10-26 ENCOUNTER — OUTPATIENT (OUTPATIENT)
Dept: OUTPATIENT SERVICES | Facility: HOSPITAL | Age: 54
LOS: 1 days | End: 2024-10-26
Payer: COMMERCIAL

## 2024-10-26 DIAGNOSIS — Z00.8 ENCOUNTER FOR OTHER GENERAL EXAMINATION: ICD-10-CM

## 2024-10-26 DIAGNOSIS — Z98.890 OTHER SPECIFIED POSTPROCEDURAL STATES: Chronic | ICD-10-CM

## 2024-10-26 PROCEDURE — 77063 BREAST TOMOSYNTHESIS BI: CPT | Mod: 26

## 2024-10-26 PROCEDURE — 76641 ULTRASOUND BREAST COMPLETE: CPT | Mod: 26,50

## 2024-10-26 PROCEDURE — 77063 BREAST TOMOSYNTHESIS BI: CPT

## 2024-10-26 PROCEDURE — 77067 SCR MAMMO BI INCL CAD: CPT

## 2024-10-26 PROCEDURE — 76641 ULTRASOUND BREAST COMPLETE: CPT

## 2024-10-26 PROCEDURE — 77067 SCR MAMMO BI INCL CAD: CPT | Mod: 26

## 2024-12-16 ENCOUNTER — APPOINTMENT (OUTPATIENT)
Dept: OBGYN | Facility: CLINIC | Age: 54
End: 2024-12-16
Payer: COMMERCIAL

## 2024-12-16 VITALS — DIASTOLIC BLOOD PRESSURE: 74 MMHG | SYSTOLIC BLOOD PRESSURE: 165 MMHG

## 2024-12-16 VITALS — DIASTOLIC BLOOD PRESSURE: 92 MMHG | SYSTOLIC BLOOD PRESSURE: 166 MMHG

## 2024-12-16 DIAGNOSIS — N90.5 ATROPHY OF VULVA: ICD-10-CM

## 2024-12-16 DIAGNOSIS — N90.4 LEUKOPLAKIA OF VULVA: ICD-10-CM

## 2024-12-16 PROCEDURE — 99214 OFFICE O/P EST MOD 30 MIN: CPT

## 2024-12-16 PROCEDURE — 99459 PELVIC EXAMINATION: CPT

## 2025-06-05 NOTE — H&P PST ADULT - PAIN ASSESSMENT COMPLETED
Congratulations again on the birth of your baby!     The first six weeks following your delivery are known as the postpartum period.  Here are some general instructions to help both you and your baby have a healthy and happy postpartum recovery.  A member of your health care team will follow up with you in approximately 2-3 weeks postpartum.    Rest & Sleep:  Focus on yourself and baby during this time, and get plenty of rest for the first few weeks.    Sleep when your baby sleeps and allow support people help you rest (care for other children, prepare meals, shopping, cleaning, etc).  Do not lift anything heavier than your baby.  Take short walks if possible throughout the day.    Nutrition:  Eat nutritious and well balanced meals to provide your body the energy it needs.  Drink at least 8 glasses of water every day (try drinking a glass of water every time you feed the baby).  Do not diet at this time.  Breastfeeding mothers require extra fluid, calories, protein and calcium.   Avoid tobacco, alcohol and non-essential medications when breastfeeding.     Postpartum Bleeding ([place]ochia):  Expect bleeding for up to 6 weeks.  Expect normal period odor from your bleeding.  Change your sanitary pad often, and wash your hands before changing.  DO NOT have intercourse, use tampons, or place anything in your vagina for 6-8 weeks to allow your body time to heal.    Call your doctor/midwife for foul smelling vaginal discharge, large clots, or heavy bleeding (saturating a pad in an hour).      Care:   Keep incision clean and dry.  When washing the incision, use mild, fragrance free soap and pat to dry.  If Steri-strips were used, do not remove them - they will start to fall off on their own.  If they have not fallen off after 10 days, you may remove them.  Hold a pillow against the incision when you laugh or cough and when you get up from a lying or sitting position.  You may shower, but no soaking in a bathtub/pool/hot  tub for 8 weeks.   Don't drive until your healthcare provider says it's okay.  No heavy lifting. Lift nothing heavier than 10 pounds for 6-8 weeks.   Plan your activities so that you don't have to go up or down stairs more than needed.   Anticipate vaginal spotting following surgery. This may not begin until 2 weeks following surgery and may wax and wane for a duration of 6-8 weeks following surgery.   Call doctor/midwife right away for fever, redness, swelling, increased pain or drainage from incision.  If you develop chest pain, shortness of breath, lower extremity edema or pain, go to the local emergency room.       Postpartum Family Planning:   Continue discussions with your provider at your follow up visit.    Postpartum Adjustment:   Becoming a mother involves many changes to your mind and body. Although you may have expected to be excited and happy, instead you may be unsure of yourself in your new role, and you may find that you are easily upset, impatient, irritable or tearful. This is normal and comes and goes quickly.  \"Baby blues\" may occur anywhere from a few days after delivery to several weeks postpartum and will pass in a short time.     Postpartum Depression:  Postpartum depression goes beyond \"baby blues\" and is persistent, intense, and severe.  The most common symptom is a feeling of deep sadness.  You may also feel as if you just can't cope with life.  Other symptoms include:  thoughts of harming yourself or the baby  lack of interest in the baby, family, or friends  feeling guilty or worthless  feeling hopeless  uncontrollable crying  feelings of being a bad mother  trouble sleeping, oversleeping or feeling tired all the time  changes in appetite  strong feelings of irritability, anger, or nervousness  having trouble thinking clearly or making decisions  having headaches, aches and pains, or stomach problems that won't go away  thinking about death or suicide    The exact cause of postpartum  depression is unknown. Changes in brain chemistry or structure are believed to play a big role in depression. It may be due to changes in your hormones during and after childbirth. You may also be tired from caring for your baby and adjusting to being a mother. All these factors may make you feel depressed. In some cases, your genes may also play a role.    Postpartum depression can be treated in many ways.  Talking to your healthcare provider is the first step toward feeling better.    Call your doctor or midwife immediately if you:  Cry for no clear reason  Have trouble sleeping, eating, and making choices  Question whether you can handle caring for your baby  Have intense feelings of sadness, anxiety, or despair that prevent you from being able to do your daily tasks    Here are some additional resources offering support for Postpartum Depression:    Postpartum Support International: (898) 393-4938   Mom's Mental Health Initiative ([facility]) https://momsmentalhealthmke.org/  Mental Health [facility] (287) 631-4986 or (953) 049-2375   National [place] for Mental Health: (403) 587-2706  National Suicide Prevention (156) 435-3844  Postpartum Progress https://postpartumprogress.com/    National Maternal Health Hotline- 2-531-806-0492   Call or text the Hotline anytime to connect.  You do not need a diagnosis to reach out for help - we are here for you.  Available , 365 days a year, in English or [place]ian and other languages by request.    Staffed by licensed and credentialed  mental health and healthcare providers, childbirth professionals, and certified peer specialists, the Hotline provides immediate and informed access to support, understanding, brief intervention, and resources to all pregnant, postpartum, and post-loss individuals AND their partners and families.     Postpartum Preeclampsia: A serious condition that occurs when a woman has high blood pressure and excess  protein in her urine soon after childbirth. It usually occurs within a few days of birth, but can develop up to 6 weeks after having the baby. Preeclampsia can result in seizures and other serious complications and requires prompt treatment.     Call your doctor or midwife immediately if you experience any of the following symptoms that could be signs of Preeclampsia:  Increased swelling in your face, hands or legs  severe headache that doesn't go away  abdominal pain  shortness of breath / difficulty breathing  nausea and vomiting   confusion  vision changes:blurred vision or flashing spots   gain more than 3 pounds in three days    Summary of when to call your doctor or midwife:  Foul smelling vaginal discharge  Passing large blood clots or heavy bleeding (saturating a pad in an hour)  Fever above 100.4F  Redness, swelling, increased pain or drainage from incision (if you had a )  Redness & pain in any area of breasts  Flu-like symptoms (such as fever, chills, body aches, etc.)  Fainting, dizziness or lightheadedness  Postpartum depression: Crying for no clear reason, having trouble sleeping, eating, and making choices; questioning whether you can handle caring for your baby; having intense feelings of sadness, anxiety, or despair that prevent you from being able to do your daily tasks  Preeclampsia symptoms: increased swelling in face, hands or legs; headache,abdominal pain, shortness of breath, nausea and vomiting, confusion, vision changes, gaining more than 3 pounds in 3 days.    New or worsening symptoms or pain, not relieved by medicine or rest    For Your Information:  Your blood type is B Rh Positive    Vital Most Recent Value   Weight 89.4 kg (197 lb 1.5 oz)   Height 5' 1\" (154.9 cm)     Last Hemoglobin & Hematocrit:    HGB (g/dL)   Date Value   2025 9.0 (L)     HCT (%)   Date Value   2025 26.2 (L)         Immunizations:  Most Recent Immunizations   Administered Date(s) Administered     COVID Pfizer 12Y+ 08/29/2022    DPT 09/25/1996    DTaP 02/28/2001    DTaP, 5 Pertussis Antigens 02/28/2001    HIB (HbOC) 09/25/1996    Hep B, adolescent or pediatric 09/25/1996    Hib (PRP-OMP) 09/25/1996    IPV 02/28/2001    Influenza, split virus, quadrivalent, PF 11/30/2023    Influenza, split virus, trivalent 12/15/2003    Influenza, split virus, trivalent, PF 09/27/2024    MMR 02/28/2001    PPD 01/02/2024    Polio, Oral 03/27/1996    TD (Adult), 2 Lf tetanus toxoid, preserve free, Adsorbed 12/06/2017    Tdap 09/10/2021   Deferred Date(s) Deferred    MMR 06/04/2025    Tdap 06/04/2025    Varicella 06/04/2025     Additional Questions:  If you have additional questions about these discharge instructions, please call [facility]'s [place] at [phone].      Our Hospital and Medical team have enjoyed caring for you during this special time, and we wish you a safe and healthy recovery.         Want to Say “Thank You” to a Nurse?  The LILIA Award® was created in memory of VERO Cannon by his family to say thank you to nurses who provide an outstanding level of care.    Submit a nomination using any method below.     OR    [email or web address]  Or visit the Resource section   on your QReca! [email or web address]         Yes

## 2025-07-08 ENCOUNTER — APPOINTMENT (OUTPATIENT)
Dept: INTERNAL MEDICINE | Facility: CLINIC | Age: 55
End: 2025-07-08

## 2025-08-20 ENCOUNTER — APPOINTMENT (OUTPATIENT)
Dept: INTERNAL MEDICINE | Facility: CLINIC | Age: 55
End: 2025-08-20

## 2025-08-20 ENCOUNTER — OUTPATIENT (OUTPATIENT)
Dept: OUTPATIENT SERVICES | Facility: HOSPITAL | Age: 55
LOS: 1 days | End: 2025-08-20
Payer: COMMERCIAL

## 2025-08-20 VITALS
HEART RATE: 75 BPM | BODY MASS INDEX: 26.98 KG/M2 | OXYGEN SATURATION: 99 % | SYSTOLIC BLOOD PRESSURE: 130 MMHG | DIASTOLIC BLOOD PRESSURE: 70 MMHG | WEIGHT: 158 LBS | HEIGHT: 64 IN

## 2025-08-20 DIAGNOSIS — Z13.39 ENCOUNTER FOR SCREENING EXAMINATION FOR OTHER MENTAL HEALTH AND BEHAVIORAL DISORDERS: ICD-10-CM

## 2025-08-20 DIAGNOSIS — Z00.00 ENCOUNTER FOR GENERAL ADULT MEDICAL EXAMINATION WITHOUT ABNORMAL FINDINGS: ICD-10-CM

## 2025-08-20 DIAGNOSIS — M85.80 OTHER SPECIFIED DISORDERS OF BONE DENSITY AND STRUCTURE, UNSPECIFIED SITE: ICD-10-CM

## 2025-08-20 DIAGNOSIS — Z13.31 ENCOUNTER FOR SCREENING FOR DEPRESSION: ICD-10-CM

## 2025-08-20 DIAGNOSIS — I51.7 CARDIOMEGALY: ICD-10-CM

## 2025-08-20 DIAGNOSIS — N90.4 LEUKOPLAKIA OF VULVA: ICD-10-CM

## 2025-08-20 DIAGNOSIS — Z13.39 ENCOUNTER FOR SCREENING EXAM FOR OTHER MENTAL HEALTH AND BEHAVIORAL DISORDERS: ICD-10-CM

## 2025-08-20 DIAGNOSIS — Z12.39 ENCOUNTER FOR OTHER SCREENING FOR MALIGNANT NEOPLASM OF BREAST: ICD-10-CM

## 2025-08-20 DIAGNOSIS — K76.0 FATTY (CHANGE OF) LIVER, NOT ELSEWHERE CLASSIFIED: ICD-10-CM

## 2025-08-20 DIAGNOSIS — Z00.00 ENCOUNTER FOR GENERAL ADULT MEDICAL EXAMINATION W/OUT ABNORMAL FINDINGS: ICD-10-CM

## 2025-08-20 DIAGNOSIS — R92.30 DENSE BREASTS, UNSPECIFIED: ICD-10-CM

## 2025-08-20 DIAGNOSIS — E78.00 PURE HYPERCHOLESTEROLEMIA, UNSPECIFIED: ICD-10-CM

## 2025-08-20 DIAGNOSIS — Z98.890 OTHER SPECIFIED POSTPROCEDURAL STATES: Chronic | ICD-10-CM

## 2025-08-20 DIAGNOSIS — R73.03 PREDIABETES: ICD-10-CM

## 2025-08-20 DIAGNOSIS — R73.03 PREDIABETES.: ICD-10-CM

## 2025-08-20 DIAGNOSIS — N95.1 MENOPAUSAL AND FEMALE CLIMACTERIC STATES: ICD-10-CM

## 2025-08-20 DIAGNOSIS — I10 ESSENTIAL (PRIMARY) HYPERTENSION: ICD-10-CM

## 2025-08-20 PROCEDURE — G0442 ANNUAL ALCOHOL SCREEN 15 MIN: CPT

## 2025-08-20 PROCEDURE — G0444 DEPRESSION SCREEN ANNUAL: CPT | Mod: 59

## 2025-08-20 PROCEDURE — 99396 PREV VISIT EST AGE 40-64: CPT

## 2025-08-20 PROCEDURE — 93010 ELECTROCARDIOGRAM REPORT: CPT

## 2025-08-20 PROCEDURE — G0463: CPT

## 2025-08-21 ENCOUNTER — TRANSCRIPTION ENCOUNTER (OUTPATIENT)
Age: 55
End: 2025-08-21

## 2025-08-21 LAB
25(OH)D3 SERPL-MCNC: 23.4 NG/ML
ALBUMIN SERPL ELPH-MCNC: 4.8 G/DL
ALBUMIN, RANDOM URINE: <1.2 MG/DL
ALP BLD-CCNC: 61 U/L
ALT SERPL-CCNC: 27 U/L
ANION GAP SERPL CALC-SCNC: 15 MMOL/L
AST SERPL-CCNC: 23 U/L
BASOPHILS # BLD AUTO: 0.07 K/UL
BASOPHILS NFR BLD AUTO: 0.9 %
BILIRUB SERPL-MCNC: 0.5 MG/DL
BUN SERPL-MCNC: 14 MG/DL
CALCIUM SERPL-MCNC: 10 MG/DL
CHLORIDE SERPL-SCNC: 101 MMOL/L
CHOLEST SERPL-MCNC: 283 MG/DL
CO2 SERPL-SCNC: 25 MMOL/L
CREAT SERPL-MCNC: 0.5 MG/DL
CREAT SPEC-SCNC: 117 MG/DL
CRP SERPL HS-MCNC: 2.77 MG/L
EGFRCR SERPLBLD CKD-EPI 2021: 110 ML/MIN/1.73M2
EOSINOPHIL # BLD AUTO: 0.21 K/UL
EOSINOPHIL NFR BLD AUTO: 2.8 %
ESTIMATED AVERAGE GLUCOSE: 137 MG/DL
GLUCOSE SERPL-MCNC: 112 MG/DL
HBA1C MFR BLD HPLC: 6.4 %
HCT VFR BLD CALC: 44.3 %
HDLC SERPL-MCNC: 48 MG/DL
HGB BLD-MCNC: 14.1 G/DL
IMM GRANULOCYTES NFR BLD AUTO: 0.3 %
LDLC SERPL-MCNC: 195 MG/DL
LYMPHOCYTES # BLD AUTO: 3.47 K/UL
LYMPHOCYTES NFR BLD AUTO: 46.6 %
MAN DIFF?: NORMAL
MCHC RBC-ENTMCNC: 29.3 PG
MCHC RBC-ENTMCNC: 31.8 G/DL
MCV RBC AUTO: 92.1 FL
MICROALBUMIN/CREAT 24H UR-RTO: NORMAL MG/G
MONOCYTES # BLD AUTO: 0.48 K/UL
MONOCYTES NFR BLD AUTO: 6.4 %
NEUTROPHILS # BLD AUTO: 3.2 K/UL
NEUTROPHILS NFR BLD AUTO: 43 %
NONHDLC SERPL-MCNC: 235 MG/DL
PLATELET # BLD AUTO: 304 K/UL
POTASSIUM SERPL-SCNC: 4.4 MMOL/L
PROT SERPL-MCNC: 7.5 G/DL
RBC # BLD: 4.81 M/UL
RBC # FLD: 13.3 %
SODIUM SERPL-SCNC: 141 MMOL/L
T4 FREE SERPL-MCNC: 1.4 NG/DL
TRIGL SERPL-MCNC: 208 MG/DL
TSH SERPL-ACNC: 0.93 UIU/ML
WBC # FLD AUTO: 7.45 K/UL

## 2025-09-03 RX ORDER — EVOLOCUMAB 140 MG/ML
140 INJECTION, SOLUTION SUBCUTANEOUS
Qty: 6 | Refills: 3 | Status: ACTIVE | COMMUNITY
Start: 2025-08-30

## (undated) DEVICE — NDL HYPO REGULAR BEVEL 18GA X 1.5" (PINK)

## (undated) DEVICE — DRAPE SPLIT SHEET 77" X 108"

## (undated) DEVICE — TUBING SUCTION 20FT

## (undated) DEVICE — PREP BETADINE SPONGE STICKS

## (undated) DEVICE — GLV 8 PROTEXIS (WHITE)

## (undated) DEVICE — SPECIMEN CONTAINER 100ML

## (undated) DEVICE — SYR LUER LOK 30CC

## (undated) DEVICE — DRAPE EXTREMITY 87" X 128.5"

## (undated) DEVICE — NSA-STRYKER VIDEO TOWER: Type: DURABLE MEDICAL EQUIPMENT

## (undated) DEVICE — DRSG COMBINE 5X9"

## (undated) DEVICE — S&N ARTHROCARE WAND COBLATION WEREWOLF FLOW 90

## (undated) DEVICE — SLV COMPRESSION KNEE MED

## (undated) DEVICE — PREP BETADINE KIT

## (undated) DEVICE — POSITIONER FOAM EGG CRATE ULNAR 2PCS (PINK)

## (undated) DEVICE — TUBING LINVATEC ARTHROSCOPY IN/OUTFLOW

## (undated) DEVICE — SYR LUER LOK 20CC

## (undated) DEVICE — DRAPE TOWEL BLUE 17" X 24"

## (undated) DEVICE — NDL HYPO SAFE 22G X 1.5" (BLACK)

## (undated) DEVICE — GLV 7.5 PROTEXIS (WHITE)

## (undated) DEVICE — WARMING BLANKET UPPER ADULT

## (undated) DEVICE — SOL IRR BAG NS 0.9% 3000ML

## (undated) DEVICE — SHAVER BLADE S&N INCISOR 4.5MM STRAIGHT (LIME GREEN)

## (undated) DEVICE — DRAPE 1/2 SHEET 40X57"

## (undated) DEVICE — PACK KNEE ARTHROSCOPY

## (undated) DEVICE — SUT MONOSOF 4-0 18" C-13

## (undated) DEVICE — SHAVER BLADE S&N FULL RADIUS 3.5MM STRAIGHT (BEIGE)

## (undated) DEVICE — SYR LUER LOK 5CC

## (undated) DEVICE — NDL SPINAL 18G X 3.5" (PINK)